# Patient Record
Sex: MALE | Race: WHITE | NOT HISPANIC OR LATINO | Employment: OTHER | URBAN - METROPOLITAN AREA
[De-identification: names, ages, dates, MRNs, and addresses within clinical notes are randomized per-mention and may not be internally consistent; named-entity substitution may affect disease eponyms.]

---

## 2021-12-12 ENCOUNTER — OFFICE VISIT (OUTPATIENT)
Dept: URGENT CARE | Facility: CLINIC | Age: 66
End: 2021-12-12
Payer: COMMERCIAL

## 2021-12-12 VITALS
WEIGHT: 174 LBS | HEIGHT: 69 IN | BODY MASS INDEX: 25.77 KG/M2 | TEMPERATURE: 98.1 F | OXYGEN SATURATION: 97 % | HEART RATE: 95 BPM | RESPIRATION RATE: 18 BRPM

## 2021-12-12 DIAGNOSIS — R68.89 FLU-LIKE SYMPTOMS: Primary | ICD-10-CM

## 2021-12-12 PROCEDURE — U0003 INFECTIOUS AGENT DETECTION BY NUCLEIC ACID (DNA OR RNA); SEVERE ACUTE RESPIRATORY SYNDROME CORONAVIRUS 2 (SARS-COV-2) (CORONAVIRUS DISEASE [COVID-19]), AMPLIFIED PROBE TECHNIQUE, MAKING USE OF HIGH THROUGHPUT TECHNOLOGIES AS DESCRIBED BY CMS-2020-01-R: HCPCS | Performed by: EMERGENCY MEDICINE

## 2021-12-12 PROCEDURE — U0005 INFEC AGEN DETEC AMPLI PROBE: HCPCS | Performed by: EMERGENCY MEDICINE

## 2021-12-12 PROCEDURE — 99203 OFFICE O/P NEW LOW 30 MIN: CPT | Performed by: EMERGENCY MEDICINE

## 2021-12-12 RX ORDER — AMLODIPINE BESYLATE 5 MG/1
5 TABLET ORAL DAILY
COMMUNITY
Start: 2021-10-28

## 2021-12-13 LAB — SARS-COV-2 RNA RESP QL NAA+PROBE: NEGATIVE

## 2025-06-15 ENCOUNTER — APPOINTMENT (EMERGENCY)
Dept: RADIOLOGY | Facility: HOSPITAL | Age: 70
End: 2025-06-15
Payer: COMMERCIAL

## 2025-06-15 ENCOUNTER — HOSPITAL ENCOUNTER (EMERGENCY)
Facility: HOSPITAL | Age: 70
Discharge: HOME | End: 2025-06-15
Attending: EMERGENCY MEDICINE
Payer: COMMERCIAL

## 2025-06-15 ENCOUNTER — APPOINTMENT (EMERGENCY)
Dept: CT IMAGING | Facility: HOSPITAL | Age: 70
End: 2025-06-15
Payer: COMMERCIAL

## 2025-06-15 VITALS
HEART RATE: 75 BPM | OXYGEN SATURATION: 98 % | WEIGHT: 172.84 LBS | BODY MASS INDEX: 25.52 KG/M2 | SYSTOLIC BLOOD PRESSURE: 159 MMHG | RESPIRATION RATE: 18 BRPM | TEMPERATURE: 97.9 F | DIASTOLIC BLOOD PRESSURE: 80 MMHG

## 2025-06-15 DIAGNOSIS — K76.9 LESION OF LIVER: ICD-10-CM

## 2025-06-15 DIAGNOSIS — R91.8 PULMONARY NODULES: ICD-10-CM

## 2025-06-15 DIAGNOSIS — K86.89 PANCREATIC MASS: Primary | ICD-10-CM

## 2025-06-15 LAB
2HR DELTA HS TROPONIN: 1 NG/L
ALBUMIN SERPL BCG-MCNC: 4.3 G/DL (ref 3.5–5)
ALP SERPL-CCNC: 160 U/L (ref 34–104)
ALT SERPL W P-5'-P-CCNC: 20 U/L (ref 7–52)
ANION GAP SERPL CALCULATED.3IONS-SCNC: 8 MMOL/L (ref 4–13)
AST SERPL W P-5'-P-CCNC: 21 U/L (ref 13–39)
BASOPHILS # BLD AUTO: 0.08 THOUSANDS/ÂΜL (ref 0–0.1)
BASOPHILS NFR BLD AUTO: 1 % (ref 0–1)
BILIRUB SERPL-MCNC: 0.48 MG/DL (ref 0.2–1)
BNP SERPL-MCNC: 18 PG/ML (ref 0–100)
BUN SERPL-MCNC: 22 MG/DL (ref 5–25)
CALCIUM SERPL-MCNC: 9.7 MG/DL (ref 8.4–10.2)
CARDIAC TROPONIN I PNL SERPL HS: 4 NG/L (ref ?–50)
CARDIAC TROPONIN I PNL SERPL HS: 5 NG/L (ref ?–50)
CHLORIDE SERPL-SCNC: 106 MMOL/L (ref 96–108)
CO2 SERPL-SCNC: 25 MMOL/L (ref 21–32)
CREAT SERPL-MCNC: 1.12 MG/DL (ref 0.6–1.3)
EOSINOPHIL # BLD AUTO: 0.26 THOUSAND/ÂΜL (ref 0–0.61)
EOSINOPHIL NFR BLD AUTO: 2 % (ref 0–6)
ERYTHROCYTE [DISTWIDTH] IN BLOOD BY AUTOMATED COUNT: 12.1 % (ref 11.6–15.1)
GFR SERPL CREATININE-BSD FRML MDRD: 66 ML/MIN/1.73SQ M
GLUCOSE SERPL-MCNC: 98 MG/DL (ref 65–140)
HCT VFR BLD AUTO: 39.4 % (ref 36.5–49.3)
HGB BLD-MCNC: 12.7 G/DL (ref 12–17)
IMM GRANULOCYTES # BLD AUTO: 0.04 THOUSAND/UL (ref 0–0.2)
IMM GRANULOCYTES NFR BLD AUTO: 0 % (ref 0–2)
LIPASE SERPL-CCNC: 32 U/L (ref 11–82)
LYMPHOCYTES # BLD AUTO: 1.52 THOUSANDS/ÂΜL (ref 0.6–4.47)
LYMPHOCYTES NFR BLD AUTO: 14 % (ref 14–44)
MCH RBC QN AUTO: 30.5 PG (ref 26.8–34.3)
MCHC RBC AUTO-ENTMCNC: 32.2 G/DL (ref 31.4–37.4)
MCV RBC AUTO: 95 FL (ref 82–98)
MONOCYTES # BLD AUTO: 1.15 THOUSAND/ÂΜL (ref 0.17–1.22)
MONOCYTES NFR BLD AUTO: 11 % (ref 4–12)
NEUTROPHILS # BLD AUTO: 7.94 THOUSANDS/ÂΜL (ref 1.85–7.62)
NEUTS SEG NFR BLD AUTO: 72 % (ref 43–75)
NRBC BLD AUTO-RTO: 0 /100 WBCS
PLATELET # BLD AUTO: 528 THOUSANDS/UL (ref 149–390)
PMV BLD AUTO: 9.8 FL (ref 8.9–12.7)
POTASSIUM SERPL-SCNC: 3.6 MMOL/L (ref 3.5–5.3)
PROT SERPL-MCNC: 8 G/DL (ref 6.4–8.4)
RBC # BLD AUTO: 4.17 MILLION/UL (ref 3.88–5.62)
SODIUM SERPL-SCNC: 139 MMOL/L (ref 135–147)
WBC # BLD AUTO: 10.99 THOUSAND/UL (ref 4.31–10.16)

## 2025-06-15 PROCEDURE — 71045 X-RAY EXAM CHEST 1 VIEW: CPT

## 2025-06-15 PROCEDURE — 85025 COMPLETE CBC W/AUTO DIFF WBC: CPT | Performed by: EMERGENCY MEDICINE

## 2025-06-15 PROCEDURE — 80053 COMPREHEN METABOLIC PANEL: CPT | Performed by: EMERGENCY MEDICINE

## 2025-06-15 PROCEDURE — 93005 ELECTROCARDIOGRAM TRACING: CPT

## 2025-06-15 PROCEDURE — 99285 EMERGENCY DEPT VISIT HI MDM: CPT

## 2025-06-15 PROCEDURE — 36415 COLL VENOUS BLD VENIPUNCTURE: CPT | Performed by: EMERGENCY MEDICINE

## 2025-06-15 PROCEDURE — 96375 TX/PRO/DX INJ NEW DRUG ADDON: CPT

## 2025-06-15 PROCEDURE — 83880 ASSAY OF NATRIURETIC PEPTIDE: CPT | Performed by: EMERGENCY MEDICINE

## 2025-06-15 PROCEDURE — 74177 CT ABD & PELVIS W/CONTRAST: CPT

## 2025-06-15 PROCEDURE — 84484 ASSAY OF TROPONIN QUANT: CPT | Performed by: EMERGENCY MEDICINE

## 2025-06-15 PROCEDURE — 83690 ASSAY OF LIPASE: CPT | Performed by: EMERGENCY MEDICINE

## 2025-06-15 PROCEDURE — 71260 CT THORAX DX C+: CPT

## 2025-06-15 PROCEDURE — 96361 HYDRATE IV INFUSION ADD-ON: CPT

## 2025-06-15 PROCEDURE — 99285 EMERGENCY DEPT VISIT HI MDM: CPT | Performed by: EMERGENCY MEDICINE

## 2025-06-15 PROCEDURE — 96374 THER/PROPH/DIAG INJ IV PUSH: CPT

## 2025-06-15 RX ORDER — PANTOPRAZOLE SODIUM 40 MG/10ML
40 INJECTION, POWDER, LYOPHILIZED, FOR SOLUTION INTRAVENOUS ONCE
Status: COMPLETED | OUTPATIENT
Start: 2025-06-15 | End: 2025-06-15

## 2025-06-15 RX ORDER — OXYCODONE AND ACETAMINOPHEN 5; 325 MG/1; MG/1
1 TABLET ORAL EVERY 8 HOURS PRN
Qty: 12 TABLET | Refills: 0 | Status: SHIPPED | OUTPATIENT
Start: 2025-06-15 | End: 2025-06-19

## 2025-06-15 RX ORDER — KETOROLAC TROMETHAMINE 30 MG/ML
15 INJECTION, SOLUTION INTRAMUSCULAR; INTRAVENOUS ONCE
Status: COMPLETED | OUTPATIENT
Start: 2025-06-15 | End: 2025-06-15

## 2025-06-15 RX ORDER — SODIUM CHLORIDE 9 MG/ML
3 INJECTION INTRAVENOUS
Status: DISCONTINUED | OUTPATIENT
Start: 2025-06-15 | End: 2025-06-15 | Stop reason: HOSPADM

## 2025-06-15 RX ORDER — ASPIRIN 81 MG/1
324 TABLET, CHEWABLE ORAL ONCE
Status: COMPLETED | OUTPATIENT
Start: 2025-06-15 | End: 2025-06-15

## 2025-06-15 RX ADMIN — KETOROLAC TROMETHAMINE 15 MG: 30 INJECTION, SOLUTION INTRAMUSCULAR at 11:38

## 2025-06-15 RX ADMIN — ASPIRIN 324 MG: 81 TABLET, CHEWABLE ORAL at 10:31

## 2025-06-15 RX ADMIN — PANTOPRAZOLE SODIUM 40 MG: 40 INJECTION, POWDER, FOR SOLUTION INTRAVENOUS at 11:39

## 2025-06-15 RX ADMIN — IOHEXOL 100 ML: 350 INJECTION, SOLUTION INTRAVENOUS at 11:25

## 2025-06-15 RX ADMIN — SODIUM CHLORIDE 1000 ML: 0.9 INJECTION, SOLUTION INTRAVENOUS at 11:38

## 2025-06-15 NOTE — ED PROVIDER NOTES
Time reflects when diagnosis was documented in both MDM as applicable and the Disposition within this note       Time User Action Codes Description Comment    6/15/2025  1:24 PM Brandee Chang Add [K86.89] Pancreatic mass     6/15/2025  1:29 PM Brandee Chang Add [R91.8] Pulmonary nodules     6/15/2025  1:30 PM Brandee Chang Add [K76.9] Lesion of liver           ED Disposition       ED Disposition   Discharge    Condition   Stable    Date/Time   Sun Nilay 15, 2025  1:24 PM    Comment   Timo Sandoval discharge to home/self care.                   Assessment & Plan       Medical Decision Making  Differential diagnosis includes but not limited to: Epigastric pain, GERD, pancreatitis,, acute cholecystitis, ACS    Amount and/or Complexity of Data Reviewed  Labs: ordered.  Radiology: ordered.    Risk  OTC drugs.  Prescription drug management.        ED Course as of 06/15/25 1549   Sun Nilay 15, 2025   1300 Patient found to have incidental finding of pancreatic tumor with concurrent bilateral pulmonary nodules, hypoattenuation of spleen as well as multiple liver lesions.  Patient was set up for outpatient follow-up with hematology oncology with an ambulatory referral.  He was discharged with strict return precautions if symptoms worsen in any way.       Medications   aspirin chewable tablet 324 mg (324 mg Oral Given 6/15/25 1031)   sodium chloride 0.9 % bolus 1,000 mL (0 mL Intravenous Stopped 6/15/25 1238)   ketorolac (TORADOL) injection 15 mg (15 mg Intravenous Given 6/15/25 1138)   pantoprazole (PROTONIX) injection 40 mg (40 mg Intravenous Given 6/15/25 1139)   iohexol (OMNIPAQUE) 350 MG/ML injection (MULTI-DOSE) 100 mL (100 mL Intravenous Given 6/15/25 1125)       ED Risk Strat Scores                    No data recorded                            History of Present Illness       Chief Complaint   Patient presents with    Chest Pain     Patient c/o midsternal chest pain with decreased appetite for several weeks.         Past Medical History[1]   Past Surgical History[2]   Family History[3]   Social History[4]   E-Cigarette/Vaping    E-Cigarette Use Never User       E-Cigarette/Vaping Substances      I have reviewed and agree with the history as documented.     This is a 70-year-old male presenting to the ED for evaluation of epigastric with decreased appetite for several weeks.  Patient denies any fever or chills, nausea vomiting or diarrhea.  He does state that he has not been having his usual appetite but denies any pronounced weight loss.  He states his discomfort is constant and nonradiating        Review of Systems   Constitutional:  Positive for appetite change. Negative for chills and fever.   HENT:  Negative for ear pain and sore throat.    Eyes:  Negative for pain and visual disturbance.   Respiratory:  Negative for cough and shortness of breath.    Cardiovascular:  Negative for chest pain and palpitations.   Gastrointestinal:  Positive for abdominal pain. Negative for vomiting.   Genitourinary:  Negative for dysuria and hematuria.   Musculoskeletal:  Negative for arthralgias and back pain.   Skin:  Negative for color change and rash.   Neurological:  Negative for seizures and syncope.   All other systems reviewed and are negative.          Objective       ED Triage Vitals   Temperature Pulse Blood Pressure Respirations SpO2 Patient Position - Orthostatic VS   06/15/25 1005 06/15/25 1005 06/15/25 1005 06/15/25 1005 06/15/25 1005 06/15/25 1005   97.9 °F (36.6 °C) 80 (!) 191/88 18 98 % Lying      Temp Source Heart Rate Source BP Location FiO2 (%) Pain Score    06/15/25 1005 06/15/25 1005 06/15/25 1005 -- 06/15/25 1138    Temporal Monitor Left arm  3      Vitals      Date and Time Temp Pulse SpO2 Resp BP Pain Score FACES Pain Rating User   06/15/25 1200 -- 75 98 % 18 159/80 -- -- AS   06/15/25 1138 -- -- -- -- -- 3 -- AS   06/15/25 1100 -- 79 98 % 19 162/79 -- -- MY   06/15/25 1045 -- 82 98 % 20 171/88 -- -- MY    06/15/25 1030 -- 77 98 % 17 162/83 -- -- MY   06/15/25 1015 -- 79 97 % 18 170/66 -- -- MY   06/15/25 1005 97.9 °F (36.6 °C) 80 98 % 18 191/88 -- -- AFG            Physical Exam  Vitals and nursing note reviewed.   Constitutional:       General: He is not in acute distress.     Appearance: He is well-developed and normal weight. He is not ill-appearing.   HENT:      Head: Normocephalic and atraumatic.     Eyes:      Extraocular Movements: Extraocular movements intact.      Conjunctiva/sclera: Conjunctivae normal.       Cardiovascular:      Rate and Rhythm: Normal rate and regular rhythm.      Heart sounds: Normal heart sounds. No murmur heard.  Pulmonary:      Effort: Pulmonary effort is normal. No respiratory distress.      Breath sounds: Normal breath sounds. No rhonchi or rales.   Chest:      Chest wall: No mass, deformity, tenderness or crepitus. There is no dullness to percussion.   Abdominal:      Palpations: Abdomen is soft. There is no mass.      Tenderness: There is no abdominal tenderness. There is no guarding or rebound.      Comments: Mild epigastric tenderness without guarding     Musculoskeletal:         General: No swelling. Normal range of motion.      Cervical back: Normal range of motion and neck supple.      Right lower leg: No edema.      Left lower leg: No edema.     Skin:     General: Skin is warm and dry.      Capillary Refill: Capillary refill takes less than 2 seconds.     Neurological:      General: No focal deficit present.      Mental Status: He is alert and oriented to person, place, and time.     Psychiatric:         Mood and Affect: Mood normal.         Results Reviewed       Procedure Component Value Units Date/Time    HS Troponin I 2hr [[id number]]  (Normal) Collected: 06/15/25 1226    Lab Status: Final result Specimen: Blood from Arm, Left Updated: 06/15/25 1253     hs TnI 2hr 5 ng/L      Delta 2hr hsTnI 1 ng/L     HS Troponin 0hr (reflex protocol) [[id number]]  (Normal) Collected:  06/15/25 1031    Lab Status: Final result Specimen: Blood from Arm, Left Updated: 06/15/25 1125     hs TnI 0hr 4 ng/L     B-Type Natriuretic Peptide(BNP) [[id number]]  (Normal) Collected: 06/15/25 1031    Lab Status: Final result Specimen: Blood from Arm, Left Updated: 06/15/25 1108     BNP 18 pg/mL     Comprehensive metabolic panel [[id number]]  (Abnormal) Collected: 06/15/25 1031    Lab Status: Final result Specimen: Blood from Arm, Left Updated: 06/15/25 1057     Sodium 139 mmol/L      Potassium 3.6 mmol/L      Chloride 106 mmol/L      CO2 25 mmol/L      ANION GAP 8 mmol/L      BUN 22 mg/dL      Creatinine 1.12 mg/dL      Glucose 98 mg/dL      Calcium 9.7 mg/dL      AST 21 U/L      ALT 20 U/L      Alkaline Phosphatase 160 U/L      Total Protein 8.0 g/dL      Albumin 4.3 g/dL      Total Bilirubin 0.48 mg/dL      eGFR 66 ml/min/1.73sq m     Narrative:      National Kidney Disease Foundation guidelines for Chronic Kidney Disease (CKD):     Stage 1 with normal or high GFR (GFR > 90 mL/min/1.73 square meters)    Stage 2 Mild CKD (GFR = 60-89 mL/min/1.73 square meters)    Stage 3A Moderate CKD (GFR = 45-59 mL/min/1.73 square meters)    Stage 3B Moderate CKD (GFR = 30-44 mL/min/1.73 square meters)    Stage 4 Severe CKD (GFR = 15-29 mL/min/1.73 square meters)    Stage 5 End Stage CKD (GFR <15 mL/min/1.73 square meters)  Note: GFR calculation is accurate only with a steady state creatinine    Lipase [[id number]]  (Normal) Collected: 06/15/25 1031    Lab Status: Final result Specimen: Blood from Arm, Left Updated: 06/15/25 1057     Lipase 32 u/L     CBC and differential [[id number]]  (Abnormal) Collected: 06/15/25 1031    Lab Status: Final result Specimen: Blood from Arm, Left Updated: 06/15/25 1038     WBC 10.99 Thousand/uL      RBC 4.17 Million/uL      Hemoglobin 12.7 g/dL      Hematocrit 39.4 %      MCV 95 fL      MCH 30.5 pg      MCHC 32.2 g/dL      RDW 12.1 %      MPV 9.8 fL      Platelets 528 Thousands/uL      nRBC 0  /100 WBCs      Segmented % 72 %      Immature Grans % 0 %      Lymphocytes % 14 %      Monocytes % 11 %      Eosinophils Relative 2 %      Basophils Relative 1 %      Absolute Neutrophils 7.94 Thousands/µL      Absolute Immature Grans 0.04 Thousand/uL      Absolute Lymphocytes 1.52 Thousands/µL      Absolute Monocytes 1.15 Thousand/µL      Eosinophils Absolute 0.26 Thousand/µL      Basophils Absolute 0.08 Thousands/µL             CT chest abdomen pelvis w contrast   Final Interpretation by Roxanna Madrigal MD (06/15 1240)   There is a pancreatic lesion suspicious for pancreatic cancer summarized below. Please see body of report for full description:      Tumor:   Size: 4.1 cm   Location: Pancreatic tail.   Vascular contact: Encasing and narrowing the splenic artery and vein in the splenic.   Metastasis: Hepatic and pulmonary .      Hypoattenuation in the anterior spleen likely represents splenic infarct versus metastasis.      Asymmetric enlargement of the prostate.      Computerized Assisted Algorithm (CAA) may have aided analysis of applicable images.      Resident: Bhavana Parekh I, the attending radiologist, have reviewed the images and agree with the final report above.      Workstation performed: [id number]         X-ray chest 1 view portable    (Results Pending)       Procedures    ED Medication and Procedure Management   Prior to Admission Medications   Prescriptions Last Dose Informant Patient Reported? Taking?   amLODIPine (NORVASC) 5 mg tablet   Yes No   Sig: Take 5 mg by mouth daily   atorvastatin (LIPITOR) 10 mg tablet   Yes No   Sig: Take 10 mg by mouth daily      Facility-Administered Medications: None     Discharge Medication List as of 6/15/2025  1:31 PM        START taking these medications    Details   oxyCODONE-acetaminophen (Percocet) 5-325 mg per tablet Take 1 tablet by mouth every 8 (eight) hours as needed for severe pain for up to 4 days Max Daily Amount: 3 tablets, Starting Sun  6/15/2025, Until Thu 6/19/2025 at 2359, Normal           CONTINUE these medications which have NOT CHANGED    Details   amLODIPine (NORVASC) 5 mg tablet Take 5 mg by mouth daily, Starting Thu 10/28/2021, Historical Med      atorvastatin (LIPITOR) 10 mg tablet Take 10 mg by mouth daily, Starting Thu 2/29/2024, Historical Med             ED SEPSIS DOCUMENTATION   Time reflects when diagnosis was documented in both MDM as applicable and the Disposition within this note       Time User Action Codes Description Comment    6/15/2025  1:24 PM Brandee Chang [K86.89] Pancreatic mass     6/15/2025  1:29 PM Brandee Chang [R91.8] Pulmonary nodules     6/15/2025  1:30 PM Brandee Chang [K76.9] Lesion of liver                      [1]   Past Medical History:  Diagnosis Date    Hypertension    [2] No past surgical history on file.  [3] No family history on file.  [4]   Social History  Tobacco Use    Smoking status: Never    Smokeless tobacco: Never   Vaping Use    Vaping status: Never Used   Substance Use Topics    Alcohol use: Yes     Comment: Beer daily    Drug use: Yes     Types: Marijuana        Brandee Chang DO  06/15/25 1549

## 2025-06-15 NOTE — DISCHARGE INSTRUCTIONS
Return to the ER immediately for any worsening symptoms.  You were diagnosed today with a pancreatic mass, pulmonary nodules and multiple liver lesions.  I have placed an ambulatory referral for you with the oncologist.

## 2025-06-16 LAB
ATRIAL RATE: 82 BPM
P AXIS: 63 DEGREES
PR INTERVAL: 156 MS
QRS AXIS: 18 DEGREES
QRSD INTERVAL: 96 MS
QT INTERVAL: 384 MS
QTC INTERVAL: 448 MS
T WAVE AXIS: 22 DEGREES
VENTRICULAR RATE: 82 BPM

## 2025-06-16 PROCEDURE — 93010 ELECTROCARDIOGRAM REPORT: CPT | Performed by: INTERNAL MEDICINE

## 2025-06-17 ENCOUNTER — DOCUMENTATION (OUTPATIENT)
Dept: HEMATOLOGY ONCOLOGY | Facility: CLINIC | Age: 70
End: 2025-06-17

## 2025-06-17 ENCOUNTER — TELEPHONE (OUTPATIENT)
Age: 70
End: 2025-06-17

## 2025-06-17 NOTE — PROGRESS NOTES
Chart rvw'd on 2025  Referral received to [facility], sent to CATARINA for further work up.    Referring Provider: Dr Chang    Diagnosis: pancreatic mass    EGD/EUS: N/A    Pathology: N/A    Imagin/15/25 CT c/a/p w contrast  There is a pancreatic lesion suspicious for pancreatic cancer summarized below. Please see body of report for full description:     Tumor:  Size: 4.1 cm  Location: Pancreatic tail.  Vascular contact: Encasing and narrowing the splenic artery and vein in the splenic.  Metastasis: Hepatic and pulmonary .     Hypoattenuation in the anterior spleen likely represents splenic infarct versus metastasis.     Asymmetric enlargement of the prostate.    Labs:  CBC & CMP UTD    Scheduled appointments:  No future appointments.

## 2025-06-17 NOTE — TELEPHONE ENCOUNTER
I called Timo in response to a referral that was received for patient to establish care with Medical Oncology    Outreach was made to schedule a consultation.    A consultation was scheduled for patient during this call. Patient is scheduled on 6/24/25 at 3:30 PM with Dr Prieto at the [facility]  Has the patient been seen inpatient or outpatient ? (Within in the last 3 years) No If so when, N/A    Schedule within: 3 days    Pt scheduled for first available appt at requested location.   Please advise if this appt can be moved to meet scheduling guidelines per NN review.

## 2025-06-19 ENCOUNTER — PREP FOR PROCEDURE (OUTPATIENT)
Dept: INTERVENTIONAL RADIOLOGY/VASCULAR | Facility: CLINIC | Age: 70
End: 2025-06-19

## 2025-06-19 ENCOUNTER — DOCUMENTATION (OUTPATIENT)
Dept: HEMATOLOGY ONCOLOGY | Facility: CLINIC | Age: 70
End: 2025-06-19

## 2025-06-19 DIAGNOSIS — R16.0 LIVER MASS: Primary | ICD-10-CM

## 2025-06-19 DIAGNOSIS — K86.89 PANCREATIC MASS: Primary | ICD-10-CM

## 2025-06-19 NOTE — PROGRESS NOTES
[facility] Epic msg regarding this pt from MD. 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 Msg sent by ONN to Kirsty Billings to get pt set up for liver bx after review of CT scan results.

## 2025-06-23 ENCOUNTER — PATIENT OUTREACH (OUTPATIENT)
Dept: HEMATOLOGY ONCOLOGY | Facility: CLINIC | Age: 70
End: 2025-06-23

## 2025-06-23 ENCOUNTER — DOCUMENTATION (OUTPATIENT)
Dept: HEMATOLOGY ONCOLOGY | Facility: CLINIC | Age: 70
End: 2025-06-23

## 2025-06-23 DIAGNOSIS — K86.89 MASS OF PANCREAS: Primary | ICD-10-CM

## 2025-06-23 NOTE — PROGRESS NOTES
Call to the IR scheduling team to get pt scheduled for liver biopsy,  is going to get pt in soon and message me back with a date.

## 2025-06-23 NOTE — PROGRESS NOTES
Initial outreach. Spoke to Timo Sandoval. Introduced myself and explained the role of an Oncology Nurse Navigator to assist with coordination of cancer care, preparation for upcoming appointment, be a point of contact prior to oncology consult, provide support and connect him with available resources. Discussed Medical oncology referral placed by Dr Chang. Explained I will be their main contact until they are established with their team and a treatment plan is established.     Assessed for barriers of care.   General assessment completed Yes    Do you have a history of cancer? no  Have you ever received Radiation Therapy? No  Where  did you receive RT? N/A   When? N/A    Do you have any implantable devices?   [] Pacemaker  [] Pain stimulator  [] Defibrillator  [] Implanted sleep apnea device  [x] NONE      NVD/constipation: Yes Diarrhea mostly   Weight Loss: Yes About 9 lbs  Appetite change: Yes Decreased appetite the past month (thought he was in shape more)  Fatigue: Yes- As a result of fatigue  Pain: Yes  Other: Pt states that he is down, he tries to walk daily but he has had a hard time after the CT scan.   Pt is a , he is still doing it.     Offered appt for the dietician but he declined since he understands what food choices to make.     PCP: Dr. Rodriges ([facility])  Insurance: [facility]      Reviewed upcoming appointments including date, time and location.  Future Appointments   Date Time Provider Department Center   6/24/2025  3:30 PM Nahid Prieto MD [facility]       Introduced Sabina Pickett, and explained she will be his patient navigator, who will reach out after the first consult to introduce themselves. The PN will provide support, make sure he has a good understanding of the plan and schedule and to connect with additional resources if/when needed.     My direct contact information provided. Patient is aware that he can call me should he need  any further assistance. Patient was appreciative of assistance.

## 2025-06-24 ENCOUNTER — DOCUMENTATION (OUTPATIENT)
Dept: HEMATOLOGY ONCOLOGY | Facility: CLINIC | Age: 70
End: 2025-06-24

## 2025-06-24 ENCOUNTER — PATIENT OUTREACH (OUTPATIENT)
Dept: HEMATOLOGY ONCOLOGY | Facility: CLINIC | Age: 70
End: 2025-06-24

## 2025-06-24 RX ORDER — AMLODIPINE BESYLATE 10 MG/1
10 TABLET ORAL
COMMUNITY
Start: 2025-06-11

## 2025-06-24 RX ORDER — ALPRAZOLAM 0.25 MG
0.25 TABLET ORAL DAILY PRN
COMMUNITY
Start: 2025-06-20

## 2025-06-24 NOTE — PROGRESS NOTES
NN phone outreach to the pt, we got him sched tomorrow at the [place] location with Dr. Harper for his pall care appt sched at 1230 PM. He was so thankful and appreciative of this appt spot since he mentions his pain being worse today than from when we last spoke.

## 2025-06-24 NOTE — NURSING NOTE
[facility] Interventional Radiology        [address] [place] [address]             Phone:  [phone]                  IR Scheduling: [phone]         Patient called and made aware to arrive by 0930 on 6/30/25 for liver mass biopsy. Patient told to have nothing to eat or drink starting at midnight the night prior as well as have a ride to and from the hospital. Patient verbalized understanding and given [facility] IR phone number, [phone], if they were to have any further questions.   
175.9

## 2025-06-24 NOTE — PROGRESS NOTES
Josue msg from pt's daughter regarding his severe pain, looking to get pt scheduled asap for pall care appt. Msg sent via epic secure chat to Pratima Soto to assist with appt scheduling. Pending response.

## 2025-06-25 ENCOUNTER — SOCIAL WORK (OUTPATIENT)
Dept: PALLIATIVE MEDICINE | Facility: CLINIC | Age: 70
End: 2025-06-25
Payer: COMMERCIAL

## 2025-06-25 ENCOUNTER — CONSULT (OUTPATIENT)
Dept: PALLIATIVE MEDICINE | Facility: CLINIC | Age: 70
End: 2025-06-25
Payer: COMMERCIAL

## 2025-06-25 VITALS
TEMPERATURE: 98.7 F | BODY MASS INDEX: 24.35 KG/M2 | HEIGHT: 69 IN | SYSTOLIC BLOOD PRESSURE: 155 MMHG | OXYGEN SATURATION: 99 % | RESPIRATION RATE: 16 BRPM | WEIGHT: 164.4 LBS | DIASTOLIC BLOOD PRESSURE: 75 MMHG | HEART RATE: 83 BPM

## 2025-06-25 VITALS
HEIGHT: 69 IN | WEIGHT: 164 LBS | SYSTOLIC BLOOD PRESSURE: 155 MMHG | HEART RATE: 83 BPM | OXYGEN SATURATION: 99 % | DIASTOLIC BLOOD PRESSURE: 75 MMHG | TEMPERATURE: 98.7 F | BODY MASS INDEX: 24.29 KG/M2 | RESPIRATION RATE: 16 BRPM

## 2025-06-25 DIAGNOSIS — K86.89 MASS OF PANCREAS: Primary | ICD-10-CM

## 2025-06-25 DIAGNOSIS — Z51.5 PALLIATIVE CARE BY SPECIALIST: ICD-10-CM

## 2025-06-25 DIAGNOSIS — R10.11 RIGHT UPPER QUADRANT ABDOMINAL PAIN: ICD-10-CM

## 2025-06-25 PROCEDURE — NC001 PR NO CHARGE

## 2025-06-25 PROCEDURE — 99203 OFFICE O/P NEW LOW 30 MIN: CPT | Performed by: STUDENT IN AN ORGANIZED HEALTH CARE EDUCATION/TRAINING PROGRAM

## 2025-06-25 NOTE — PROGRESS NOTES
Pt scheduled an appt with Palliative but was unsure what we do. Pt reports he was looking for a one time script for pain. MD explained to pt that we have an ongoing relationship with our patients. Pt reported that he was not interested in our services at this time. Pt  pleasantly declined to proceed with visit. MD and MELIZA understanding and let Pt know if he ever changes his mind we are here to support him in the future.

## 2025-06-25 NOTE — ASSESSMENT & PLAN NOTE
Upcoming liver biopsy and oncology appointment  Orders:    Ambulatory referral to Palliative Care

## 2025-06-25 NOTE — PROGRESS NOTES
Name: Timo Sandoval      : 1955      MRN: [id number]  Encounter Provider: Juliana Harper MD  Encounter Date: 2025   Encounter department: [facility]  :  Assessment & Plan  Mass of pancreas  Upcoming liver biopsy and oncology appointment  Orders:    Ambulatory referral to Palliative Care    Right upper quadrant abdominal pain  Advised he contact his PCP for pain medications until he sees oncology       Palliative care by specialist  Introduced palliative care and the services we provide  Time spent developing patient-provider relationship, providing psychosocial support, and validating patient experience  Encouraged to call the office with any questions/concerns  Follow up as needed           PDMP Review: I have reviewed the patient's controlled substance dispensing history in the Prescription Drug Monitoring Program in compliance with the [facility] regulations before prescribing any controlled substances.    History of Present Illness     Timo Sandoval is a 71 yo M with recently discovered pancreatic tail mass with liver and lung lesions (likely mets) who presents for palliative care consultation. He is scheduled for IR liver biopsy to better characterize these masses next week and then will see Dr. Prieto the week after. In the meantime, he is hoping for assistance with pain management. He was prescribed a short course of low dose opioids earlier this month.    He explains that he thought this appointment would be a one-time visit to get a different pain medication for the pain in his abdomen. I explained that in order to provide controlled substances we have our patients sign an agreement and engage in regular follow ups for monitoring of symptoms and side effects.    He does not wish to establish with palliative care at this time. I explained the services we provide and advised he reach out should he have interest in following with us in the future. He expressed  "understanding.     Objective   /75 (BP Location: Left arm, Patient Position: Sitting, Cuff Size: Large)   Pulse 83   Temp 98.7 °F (37.1 °C) (Temporal)   Resp 16   Ht 5' 9\" (1.753 m)   Wt 74.6 kg (164 lb 6.4 oz)   SpO2 99%   BMI 24.28 kg/m²     Physical Exam  Constitutional:       Appearance: He is not ill-appearing.   Pulmonary:      Effort: Pulmonary effort is normal. No respiratory distress.     Skin:     General: Skin is warm and dry.      Coloration: Skin is not pale.     Neurological:      General: No focal deficit present.      Mental Status: He is alert.     Psychiatric:      Comments: Guarded, cooperative         Recent labs:  Lab Results   Component Value Date/Time    SODIUM 139 06/15/2025 10:31 AM    SODIUM 142 01/21/2025 08:59 AM    K 3.6 06/15/2025 10:31 AM    K 5 01/21/2025 08:59 AM    BUN 22 06/15/2025 10:31 AM    BUN 29 (H) 01/21/2025 08:59 AM    CREATININE 1.12 06/15/2025 10:31 AM    CREATININE 1.45 (H) 02/03/2025 08:37 AM    GLUC 98 06/15/2025 10:31 AM    GLUC 111 (H) 01/21/2025 08:59 AM    CALCIUM 9.7 06/15/2025 10:31 AM    CALCIUM 9.5 01/21/2025 08:59 AM    AST 21 06/15/2025 10:31 AM    AST 16 01/21/2025 08:59 AM    ALT 20 06/15/2025 10:31 AM    ALT 14 01/21/2025 08:59 AM    ALB 4.3 06/15/2025 10:31 AM    ALB 4.5 01/21/2025 08:59 AM    TP 8.0 06/15/2025 10:31 AM    TP 7.1 01/21/2025 08:59 AM    EGFR 66 06/15/2025 10:31 AM    EGFR 52 (L) 02/03/2025 08:37 AM    EGFR 67 04/15/2020 10:24 AM     Lab Results   Component Value Date/Time    HGB 12.7 06/15/2025 10:31 AM    WBC 10.99 (H) 06/15/2025 10:31 AM     (H) 06/15/2025 10:31 AM     No results found for: \"[id number]\"    Recent Imaging:  Procedure: X-ray chest 1 view portable  Result Date: 6/16/2025  Impression: No acute cardiopulmonary disease. See subsequent chest CT. Workstation performed: [id number]     Procedure: CT chest abdomen pelvis w contrast  Addendum Date: 6/15/2025  Addendum: ADDENDUM: There is loss of fat plane " between the pancreatic tail mass and the spleen at the splenic hilum. Direct splenic invasion cannot be excluded (image 91 series 604).    Result Date: 6/15/2025  Impression: There is a pancreatic lesion suspicious for pancreatic cancer summarized below. Please see body of report for full description: Tumor: Size: 4.1 cm Location: Pancreatic tail. Vascular contact: Encasing and narrowing the splenic artery and vein in the splenic. Metastasis: Hepatic and pulmonary . Hypoattenuation in the anterior spleen likely represents splenic infarct versus metastasis. Asymmetric enlargement of the prostate. Computerized Assisted Algorithm (CAA) may have aided analysis of applicable images. Resident: Bhavana Parekh I, the attending radiologist, have reviewed the images and agree with the final report above. Workstation performed: [id number]       Administrative Statements   I have spent a total time of 35 minutes in caring for this patient on the day of the visit/encounter including Diagnostic results, Prognosis, Risks and benefits of tx options, Patient and family education, Impressions, Counseling / Coordination of care, Documenting in the medical record, Reviewing/placing orders in the medical record (including tests, medications, and/or procedures), and Obtaining or reviewing history  .   Topics discussed with the patient / family include symptom assessment and management, medication review, psychosocial support, goals of care, opioid titration, supportive listening, and anticipatory guidance.

## 2025-06-30 ENCOUNTER — HOSPITAL ENCOUNTER (OUTPATIENT)
Dept: INTERVENTIONAL RADIOLOGY/VASCULAR | Facility: HOSPITAL | Age: 70
Discharge: HOME | End: 2025-06-30
Attending: RADIOLOGY
Payer: COMMERCIAL

## 2025-06-30 VITALS
SYSTOLIC BLOOD PRESSURE: 156 MMHG | HEIGHT: 69 IN | TEMPERATURE: 98 F | WEIGHT: 165 LBS | OXYGEN SATURATION: 98 % | DIASTOLIC BLOOD PRESSURE: 81 MMHG | HEART RATE: 77 BPM | BODY MASS INDEX: 24.44 KG/M2 | RESPIRATION RATE: 20 BRPM

## 2025-06-30 DIAGNOSIS — K86.89 MASS OF PANCREAS: Primary | ICD-10-CM

## 2025-06-30 DIAGNOSIS — R16.0 LIVER MASS: ICD-10-CM

## 2025-06-30 LAB
ERYTHROCYTE [DISTWIDTH] IN BLOOD BY AUTOMATED COUNT: 11.9 % (ref 11.6–15.1)
HCT VFR BLD AUTO: 36.5 % (ref 36.5–49.3)
HGB BLD-MCNC: 11.7 G/DL (ref 12–17)
INR PPP: 1.07 (ref 0.85–1.19)
MCH RBC QN AUTO: 30.5 PG (ref 26.8–34.3)
MCHC RBC AUTO-ENTMCNC: 32.1 G/DL (ref 31.4–37.4)
MCV RBC AUTO: 95 FL (ref 82–98)
PLATELET # BLD AUTO: 460 THOUSANDS/UL (ref 149–390)
PMV BLD AUTO: 10 FL (ref 8.9–12.7)
PROTHROMBIN TIME: 14.3 SECONDS (ref 12.3–15)
RBC # BLD AUTO: 3.83 MILLION/UL (ref 3.88–5.62)
WBC # BLD AUTO: 11.27 THOUSAND/UL (ref 4.31–10.16)

## 2025-06-30 PROCEDURE — 99152 MOD SED SAME PHYS/QHP 5/>YRS: CPT

## 2025-06-30 PROCEDURE — 88341 IMHCHEM/IMCYTCHM EA ADD ANTB: CPT | Performed by: PATHOLOGY

## 2025-06-30 PROCEDURE — 88342 IMHCHEM/IMCYTCHM 1ST ANTB: CPT | Performed by: PATHOLOGY

## 2025-06-30 PROCEDURE — 85610 PROTHROMBIN TIME: CPT | Performed by: RADIOLOGY

## 2025-06-30 PROCEDURE — 88307 TISSUE EXAM BY PATHOLOGIST: CPT | Performed by: PATHOLOGY

## 2025-06-30 PROCEDURE — 85027 COMPLETE CBC AUTOMATED: CPT | Performed by: RADIOLOGY

## 2025-06-30 PROCEDURE — 47000 NEEDLE BIOPSY OF LIVER PERQ: CPT

## 2025-06-30 PROCEDURE — 76942 ECHO GUIDE FOR BIOPSY: CPT

## 2025-06-30 RX ORDER — LIDOCAINE WITH 8.4% SOD BICARB 0.9%(10ML)
SYRINGE (ML) INJECTION AS NEEDED
Status: COMPLETED | OUTPATIENT
Start: 2025-06-30 | End: 2025-06-30

## 2025-06-30 RX ORDER — FENTANYL CITRATE 50 UG/ML
INJECTION, SOLUTION INTRAMUSCULAR; INTRAVENOUS AS NEEDED
Status: COMPLETED | OUTPATIENT
Start: 2025-06-30 | End: 2025-06-30

## 2025-06-30 RX ORDER — OXYCODONE AND ACETAMINOPHEN 5; 325 MG/1; MG/1
1 TABLET ORAL EVERY 8 HOURS PRN
Qty: 30 TABLET | Refills: 0 | Status: SHIPPED | OUTPATIENT
Start: 2025-06-30 | End: 2025-07-10

## 2025-06-30 RX ORDER — MIDAZOLAM HYDROCHLORIDE 2 MG/2ML
INJECTION, SOLUTION INTRAMUSCULAR; INTRAVENOUS AS NEEDED
Status: COMPLETED | OUTPATIENT
Start: 2025-06-30 | End: 2025-06-30

## 2025-06-30 RX ADMIN — Medication 5 ML: at 11:19

## 2025-06-30 RX ADMIN — MIDAZOLAM HYDROCHLORIDE 1 MG: 1 INJECTION, SOLUTION INTRAMUSCULAR; INTRAVENOUS at 11:21

## 2025-06-30 RX ADMIN — MIDAZOLAM HYDROCHLORIDE 1 MG: 1 INJECTION, SOLUTION INTRAMUSCULAR; INTRAVENOUS at 11:14

## 2025-06-30 RX ADMIN — FENTANYL CITRATE 50 MCG: 50 INJECTION, SOLUTION INTRAMUSCULAR; INTRAVENOUS at 11:15

## 2025-06-30 RX ADMIN — FENTANYL CITRATE 50 MCG: 50 INJECTION, SOLUTION INTRAMUSCULAR; INTRAVENOUS at 11:22

## 2025-06-30 NOTE — H&P
"Interventional Radiology  History and Physical 6/30/2025     Timo Sandoval   1955   [id number]    Assessment/Plan:  70 yr old male with recently diagnosed pancreatic mass with multiple liver lesions. Here as outpatient for liver lesion biopsy. After explaining benefits and risks informed consent obtained.    Problem List Items Addressed This Visit    None  Visit Diagnoses         Liver mass        Relevant Orders    IR biopsy liver mass               Subjective:     Patient ID: Timo Sandoval is a 70 y.o. male.    History of Present Illness  70 yr old male here as outpatient for liver lesion biopsy. US guidance will be used for biopsy.    Review of Systems      Past Medical History[1]     Past Surgical History[2]     Tobacco Use History[3]     Social History     Substance and Sexual Activity   Alcohol Use Yes    Alcohol/week: 24.0 standard drinks of alcohol    Types: 24 Cans of beer per week    Comment: Beer daily        Social History     Substance and Sexual Activity   Drug Use Yes    Types: Marijuana        Allergies[4]    Current Medications[5]       Objective:    Vitals:    06/30/25 1002 06/30/25 1110   BP: 154/76 162/76   Pulse: 79 79   Resp: 20 20   Temp: 98.3 °F (36.8 °C)    SpO2: 97% 98%   Weight: 74.8 kg (165 lb)    Height: 5' 9\" (1.753 m)         Physical Exam      Lab Results   Component Value Date    BNP 18 06/15/2025      Lab Results   Component Value Date    WBC 11.27 (H) 06/30/2025    HGB 11.7 (L) 06/30/2025    HCT 36.5 06/30/2025    MCV 95 06/30/2025     (H) 06/30/2025     Lab Results   Component Value Date    INR 1.07 06/30/2025    PROTIME 14.3 06/30/2025     No results found for: \"PTT\"      I have personally reviewed pertinent imaging and laboratory results.     Code Status: No Order  Advance Directive and Living Will:      Power of :    POLST:      This text is generated with voice recognition software. There may be translation, syntax,  or grammatical errors. If you have " any questions, please contact the dictating provider.        [1]   Past Medical History:  Diagnosis Date    Hypertension    [2] No past surgical history on file.  [3]   Social History  Tobacco Use   Smoking Status Never   Smokeless Tobacco Never   [4] No Known Allergies  [5]   Current Outpatient Medications   Medication Sig Dispense Refill    ALPRAZolam (XANAX) 0.25 mg tablet Take 0.25 mg by mouth daily as needed      amLODIPine (NORVASC) 10 mg tablet Take 10 mg by mouth      atorvastatin (LIPITOR) 10 mg tablet Take 10 mg by mouth in the morning.       No current facility-administered medications for this encounter.

## 2025-06-30 NOTE — BRIEF OP NOTE (RAD/CATH)
INTERVENTIONAL RADIOLOGY PROCEDURE NOTE    Date: 6/30/2025    Procedure: Right lobe lesion core biopsy  Procedure Summary       Date: 06/30/25 Room / Location: [facility] Interventional Radiology    Anesthesia Start:  Anesthesia Stop:     Procedure: IR BIOPSY LIVER MASS Diagnosis:       Liver mass      (multiple liver lesions concerning for mets)    Scheduled Providers:  Responsible Provider:     Anesthesia Type: Not recorded ASA Status: Not recorded            Preoperative diagnosis:   1. Liver mass         Postoperative diagnosis: Same.    Surgeon: Tito Chung MD     Assistant: None. No qualified resident was available.    Blood loss: None    Specimens: Right lobe liver lesion     Findings: US showed the right hepatic lesion accessible for biopsy and 18 G x 4 core biopsies obtained and placed in Formalin. D Stat injected via coaxial needle.    Complications: None immediate.    Anesthesia: conscious sedation

## 2025-06-30 NOTE — DISCHARGE INSTRUCTIONS
[facility] Interventional Radiology        [address] [place] [address]             Phone:  [phone]                  IR Scheduling: [phone]                   POST BIOPSY    Care after your procedure:    1. Limit your activities for 24 hours after your biopsy.    2. No driving day of biopsy.    3. Return to your normal diet.Small sips of flat soda will help with mild nausea.    4. Remove band-aid or dressing 24 hours after procedure.     Contact Interventional Radiology at [phone] (ASHOK PATIENTS: Contact Interventional Radiology at [phone]) (VALENTIN PATIENTS: Contact Interventional Radiology at [phone]) if:    1. Difficulty breathing, nausea or vomiting.    2. Chills or fever above 101 degrees F.     3. Pain at biopsy site not relieved by medication.     4. Develop any redness, swelling, heat, unusual drainage, heavy bruising or bleeding from biopsy site.

## 2025-07-03 DIAGNOSIS — C25.2 MALIGNANT NEOPLASM OF TAIL OF PANCREAS (HCC): Primary | ICD-10-CM

## 2025-07-07 ENCOUNTER — OFFICE VISIT (OUTPATIENT)
Dept: HEMATOLOGY ONCOLOGY | Facility: CLINIC | Age: 70
End: 2025-07-07
Attending: EMERGENCY MEDICINE
Payer: COMMERCIAL

## 2025-07-07 ENCOUNTER — TELEPHONE (OUTPATIENT)
Age: 70
End: 2025-07-07

## 2025-07-07 ENCOUNTER — APPOINTMENT (OUTPATIENT)
Dept: LAB | Facility: MEDICAL CENTER | Age: 70
End: 2025-07-07
Payer: COMMERCIAL

## 2025-07-07 VITALS
WEIGHT: 163 LBS | BODY MASS INDEX: 24.14 KG/M2 | HEART RATE: 71 BPM | OXYGEN SATURATION: 99 % | RESPIRATION RATE: 16 BRPM | DIASTOLIC BLOOD PRESSURE: 66 MMHG | HEIGHT: 69 IN | TEMPERATURE: 97.2 F | SYSTOLIC BLOOD PRESSURE: 142 MMHG

## 2025-07-07 DIAGNOSIS — C25.9 CARCINOMA OF PANCREAS METASTATIC TO LIVER (HCC): ICD-10-CM

## 2025-07-07 DIAGNOSIS — K86.89 PANCREATIC MASS: ICD-10-CM

## 2025-07-07 DIAGNOSIS — C78.7 CARCINOMA OF PANCREAS METASTATIC TO LIVER (HCC): ICD-10-CM

## 2025-07-07 DIAGNOSIS — C25.2 MALIGNANT NEOPLASM OF TAIL OF PANCREAS (HCC): ICD-10-CM

## 2025-07-07 DIAGNOSIS — K86.89 MASS OF PANCREAS: ICD-10-CM

## 2025-07-07 DIAGNOSIS — K86.89 MASS OF PANCREAS: Primary | ICD-10-CM

## 2025-07-07 PROCEDURE — 36415 COLL VENOUS BLD VENIPUNCTURE: CPT

## 2025-07-07 PROCEDURE — 99204 OFFICE O/P NEW MOD 45 MIN: CPT | Performed by: INTERNAL MEDICINE

## 2025-07-07 PROCEDURE — G2211 COMPLEX E/M VISIT ADD ON: HCPCS | Performed by: INTERNAL MEDICINE

## 2025-07-07 NOTE — TELEPHONE ENCOUNTER
Patient called to schedule an appointment with . Patient was accidentally scheduled as a new patient and was scheduled for 8/8 at 8:30AM. Appointment was canceled and rescheduled, [employer] message sent. Patient is requesting to be seen sooner than 9/22. Patient aware he is placed on wait-list.     Please call patient for sooner appointment if able.      Thanks.

## 2025-07-07 NOTE — ASSESSMENT & PLAN NOTE
This is a 70 y.o. 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

## 2025-07-07 NOTE — PROGRESS NOTES
Oncology Teach:   Review of Plan:  Diagnosis Reviewed    Notes:  RN reviewed office handouts.  Chemotherapy and you booklet given.   Reviewed purpose of guardant testing. Form reviewed and signed by Dr. Prieto.  Kit given to patient.    Patient returning to office in 2 days to review treatment plan. [facility] referral placed, phone numbers given to patient/family to utilize if needed.    Review of Pre-Treatment Needs:  Transportation    If PICC needed, was consent obtained?:  No    Expectations at First Appointment Reviewed:  Yes    Patient Medication Education Reviewed:  Yes    Review when to call office vs. present to ED:  Yes    Provided Oncology Access Center Number:  Yes    Assessment of patient understanding:  Pt demonstrates understanding    Chemo consent obtained?:  No

## 2025-07-07 NOTE — PROGRESS NOTES
Name: Timo Sandoval      : 1955      MRN: [id number]  Encounter Provider: Nahid Prieto MD  Encounter Date: 2025   Encounter department[facility]  :  Assessment & Plan  Mass of pancreas  This is a 70 y.o. 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 Pancreatic cancer.  The patient's symptoms and imaging findings are consistent with advanced pancreatic cancer. The biopsy results confirm the presence of cancerous cells, although the final stain to confirm the origin from the pancreas is pending. The tumor is located at the tail of the pancreas and measures 4.1 cm, encasing the splenic artery and vein. A liver lesion was also noted and biopsied. The patient has experienced significant weight loss and early satiety. He is currently taking oxycodone for pain management and uses a laxative to prevent constipation.    Treatment Plan:  A blood test kit was provided to check for mutations that could be targeted with therapy. Tissue testing from the recent biopsy will also be conducted. If no mutations are found and the cancer is confirmed as pancreatic adenocarcinoma, a standard chemotherapy regimen will be recommended. Chemotherapy aims to prolong life and maintain quality of life. The potential side effects of chemotherapy include nausea, vomiting, fatigue, and increased risk of infection. Supportive care measures such as hydration, avoiding refined sugars and processed foods, and maintaining physical activity were advised. The patient was encouraged to consider clinical trials at [facility] or other institutions for potentially better treatment options.    Risks and Benefits:  The risks, benefits, and alternatives of treatment were thoroughly discussed. Chemotherapy can help control the cancer and improve symptoms but comes with side effects such as nausea, vomiting, fatigue, and increased risk of infection. Targeted therapy, if applicable, is preferred due to its effectiveness and lower toxicity. Clinical trials may offer access to newer treatments that could be more effective. Hospice care was discussed as an option if the patient decides against active treatment, but it is not recommended at this stage due to the patient's willingness to pursue treatment.    Follow-up:  A follow-up appointment will be scheduled in 2 days to discuss the final biopsy results and confirm the treatment plan.    This is a 70 y.o. 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    History of Present Illness   Chief Complaint   Patient presents with    Consult     History of Present Illness  The patient presents for pancreatic cancer. He is accompanied by his son and daughter.    He was diagnosed with pancreatic cancer on 06/15/2025, following a biopsy. Despite the diagnosis, he continues to work as a  and maintains an active lifestyle, including regular walking. His daily activities, such as climbing stairs, remain unaffected. He first noticed symptoms in 05/2025, which included a decreased appetite and a feeling of fullness after consuming just one beer. This sensation would persist until he lay down, after which he would feel hungry again. These symptoms have been  "ongoing for approximately 2 months. He also reports a weight loss of about 15 pounds, from his usual weight of 178 pounds to 180 pounds in 01/2025. He has not experienced any fevers, shaking chills, nausea, vomiting, lightheadedness, or headaches. He occasionally experiences dizziness when standing up too quickly from a kneeling position. He reports no rashes, itching, blood in urine, or changes in stool color. He has no history of smoking. He is currently under the care of palliative care and is considering clinical trials. He is also contemplating getting a power of . He has been taking oxycodone for pain management and uses [place] laxative tablets to prevent constipation.    He takes amlodipine for high blood pressure.    SOCIAL HISTORY  He does not smoke. He lives alone.    FAMILY HISTORY  He reports no family history of cancer.    Pertinent Medical History     07/07/25: HTN     Review of Systems  Denies F/C, N/V, SOB, CP, LH, HA, rash, itching, gen weakness, melena, hematuria, hematochezia, falls, diarrhea, or constipation        Objective   /66 (BP Location: Left arm, Patient Position: Sitting, Cuff Size: Adult)   Pulse 71   Temp (!) 97.2 °F (36.2 °C) (Temporal)   Resp 16   Ht 5' 9\" (1.753 m)   Wt 73.9 kg (163 lb)   SpO2 99%   BMI 24.07 kg/m²     Pain Screening:  Pain Score: 0-No pain  ECOG   0  Physical Exam  Physical Exam  Cardiovascular: Heart sounds normal, no murmurs or abnormal heart sounds noted.  Respiratory: Clear to auscultation bilaterally, no wheezes, rales, or rhonchi.  Gastrointestinal: No abdominal tenderness on palpation.  Hematologic/Lymphatic: No lower extremity edema.      Physical exam:  General:  Appears in no distress, sitting up  Neuro:  Speaks in full sentences, no focal deficits noted  Pulmonary:  No cyanosis, no accessory muscle use  Cardiovascular: Regular rate, no abnormal rhythm noted  GI:  Appears nondistended, no masses noted  Extremities:  No new rash " noted, no cyanosis  Psychiatry:  Normal mood with congruent affect  Ear nose and throat:  Atraumatic, extraocular muscles intact    _____  Results  Imaging   - CT scan of the chest, abdomen, and pelvis: 06/15/2025, Tumor at the tail of the pancreas measuring 4.1 cm, encasing the splenic artery and vein, and a liver lesion.  Labs: I have reviewed the following labs:  Lab Results   Component Value Date/Time    WBC 11.27 (H) 06/30/2025 09:59 AM    RBC 3.83 (L) 06/30/2025 09:59 AM    Hemoglobin 11.7 (L) 06/30/2025 09:59 AM    Hematocrit 36.5 06/30/2025 09:59 AM    MCV 95 06/30/2025 09:59 AM    MCH 30.5 06/30/2025 09:59 AM    RDW 11.9 06/30/2025 09:59 AM    Platelets 460 (H) 06/30/2025 09:59 AM    Segmented % 72 06/15/2025 10:31 AM    Lymphocytes % 14 06/15/2025 10:31 AM    Monocytes % 11 06/15/2025 10:31 AM    Eosinophils Relative 2 06/15/2025 10:31 AM    Basophils Relative 1 06/15/2025 10:31 AM    Immature Grans % 0 06/15/2025 10:31 AM    Absolute Neutrophils 7.94 (H) 06/15/2025 10:31 AM     Lab Results   Component Value Date/Time    Potassium 3.6 06/15/2025 10:31 AM    Potassium 5 01/21/2025 08:59 AM    Chloride 106 06/15/2025 10:31 AM    Chloride 107 01/21/2025 08:59 AM    Carbon Dioxide 26 01/21/2025 08:59 AM    CO2 25 06/15/2025 10:31 AM    BUN 22 06/15/2025 10:31 AM    BUN 29 (H) 01/21/2025 08:59 AM    Creatinine 1.12 06/15/2025 10:31 AM    Creatinine 1.45 (H) 02/03/2025 08:37 AM    Calcium 9.7 06/15/2025 10:31 AM    Calcium 9.5 01/21/2025 08:59 AM    AST 21 06/15/2025 10:31 AM    AST 16 01/21/2025 08:59 AM    ALT 20 06/15/2025 10:31 AM    ALT 14 01/21/2025 08:59 AM    Alkaline Phosphatase 160 (H) 06/15/2025 10:31 AM    Alkaline Phosphatase 76 01/21/2025 08:59 AM    Total Protein 8.0 06/15/2025 10:31 AM    Protein, Total 7.1 01/21/2025 08:59 AM    Albumin 4.3 06/15/2025 10:31 AM    ALBUMIN 4.5 01/21/2025 08:59 AM    Total Bilirubin 0.48 06/15/2025 10:31 AM    Total Bilirubin 0.6 01/21/2025 08:59 AM    eGFRcr 52  (L) 02/03/2025 08:37 AM    eGFR 66 06/15/2025 10:31 AM       Administrative Statements   I have spent a total time of 46 minutes in caring for this patient on the day of the visit/encounter including Diagnostic results, Prognosis, Counseling / Coordination of care, Documenting in the medical record, Reviewing/placing orders in the medical record (including tests, medications, and/or procedures), and Obtaining or reviewing history  .

## 2025-07-08 ENCOUNTER — PATIENT OUTREACH (OUTPATIENT)
Dept: HEMATOLOGY ONCOLOGY | Facility: CLINIC | Age: 70
End: 2025-07-08

## 2025-07-08 PROCEDURE — 88307 TISSUE EXAM BY PATHOLOGIST: CPT | Performed by: PATHOLOGY

## 2025-07-08 PROCEDURE — 88341 IMHCHEM/IMCYTCHM EA ADD ANTB: CPT | Performed by: PATHOLOGY

## 2025-07-08 PROCEDURE — 88342 IMHCHEM/IMCYTCHM 1ST ANTB: CPT | Performed by: PATHOLOGY

## 2025-07-08 NOTE — PROGRESS NOTES
ONN call back to the pt, we discussed details pertaining to a port, the pt asked if he could still work with a port and work while on treatment. He mentioned going for a second opinion with [facility] and interested in [facility] too. I asked if he was ok that I provided contact info to his daughter who was asking, he granted me permission. I sent him an email with a picture of the port since he was curious as to what this looks like. He did admit his emotions and feeling overwhelmed with all the information discussed at his consult. He is interested in seeking another opinion to see if surgery can be done elsewhere. I explained why surgery sometimes is not the best option depending how the patient presents initially when first diagnosed. He understands. Pt has my contact should he have further questions/concerns.

## 2025-07-09 ENCOUNTER — TELEPHONE (OUTPATIENT)
Dept: HEMATOLOGY ONCOLOGY | Facility: CLINIC | Age: 70
End: 2025-07-09

## 2025-07-09 ENCOUNTER — OFFICE VISIT (OUTPATIENT)
Dept: HEMATOLOGY ONCOLOGY | Facility: CLINIC | Age: 70
End: 2025-07-09
Payer: COMMERCIAL

## 2025-07-09 VITALS
TEMPERATURE: 97.3 F | HEART RATE: 75 BPM | DIASTOLIC BLOOD PRESSURE: 70 MMHG | BODY MASS INDEX: 23.92 KG/M2 | HEIGHT: 69 IN | OXYGEN SATURATION: 99 % | SYSTOLIC BLOOD PRESSURE: 128 MMHG | RESPIRATION RATE: 16 BRPM | WEIGHT: 161.5 LBS

## 2025-07-09 DIAGNOSIS — C25.9 PANCREATIC ADENOCARCINOMA (HCC): Primary | Chronic | ICD-10-CM

## 2025-07-09 PROCEDURE — 99215 OFFICE O/P EST HI 40 MIN: CPT | Performed by: INTERNAL MEDICINE

## 2025-07-09 PROCEDURE — G2211 COMPLEX E/M VISIT ADD ON: HCPCS | Performed by: INTERNAL MEDICINE

## 2025-07-09 NOTE — ASSESSMENT & PLAN NOTE
This is a 70 y.o. M c HTN who is diagnosed with metastatic pancreatic adenocarincoma, awaiting treatment

## 2025-07-09 NOTE — PROGRESS NOTES
Oncology Teach:   Review of Plan:  Diagnosis Reviewed, Treatment Plan Reviewed and Treatment Schedule Reviewed    Notes:  RN reviewed educational handout for NALIRIFOX. Reviewed possible side effects with lab and infusion recommendations.  Reviewed office handout.    Port information and elastometric ball handout reviewed.    Copy provided to patient's daughter.     Consent obtained. Copy given to patient and uploaded into patient chart.     Review of Pre-Treatment Needs:  Transportation and Lab work frequency reviewed    PICC or Port Placement Needs:  Port needed, reviewed w/ pt    If PICC needed, was consent obtained?:  No    Expectations at First Appointment Reviewed:  Yes    Patient Medication Education Reviewed:  Yes    Supportive Medication Reviewed:  OTC reviewed, Rx meds reviewed and Confirmed Rx sent to pharmacy    Review when to call office vs. present to ED:  Yes    Provided Oncology Access Center Number:  Yes    Assessment of patient understanding:  Pt demonstrates understanding    Chemo consent obtained?:  Yes

## 2025-07-09 NOTE — PROGRESS NOTES
Name: Timo Sandoval      : 1955      MRN: [id number]  Encounter Provider: Nahid Prieto MD  Encounter Date: 2025   Encounter department: [facility]  :  Assessment & Plan  Pancreatic adenocarcinoma (HCC)  This is a 70 y.o. M c HTN who is diagnosed with metastatic pancreatic adenocarincoma, awaiting treatment         Assessment & Plan  1. Stage IV pancreatic adenocarcinoma.  The biopsy results confirm the diagnosis of stage IV pancreatic adenocarcinoma, indicating metastasis from the pancreas to the liver. The treatment plan includes the placement of a port on 2025, followed by the initiation of chemotherapy the day after. Chemotherapy sessions will be scheduled every two weeks, with blood work conducted the day before each session to monitor kidney and liver function and ensure blood counts are within safe limits. The patient was advised to maintain a healthy lifestyle, including regular walking and avoiding refined sugars and processed foods. Alcohol consumption should be minimized.     The patient was encouraged to consult with [facility] regarding their clinical trial, which aims to increase survival with a potentially more tolerable treatment than standard chemotherapy. The clinical trial involves a combination of effective chemotherapy and a trial drug. The patient has the option to withdraw from the trial at any time if it becomes intolerable or inconvenient. If the patient decides to participate in the clinical trial, the current treatment plan can be adjusted accordingly. The patient was advised to contact the clinic by the end of the week with any questions or concerns.    This is a 70 y.o. M c HTN who is diagnosed with metastatic pancreatic adenocarincoma, awaiting treatment    F/u in 2 days to determine next steps  He will get a second opinion with [facility] tomorrow as there are clinical trials options  [facility] NGS ordered  TRACIE CERNA off of  the liver bx is ordered  F/u palliative care (Dr. Harper)    'liposomal irinotecan plus 5-Fuand oxliplatin is the plan as this is a metastatic (NARIFOX) process: infusion chairs are ordered    F/u: q2 weeks if chemo is being pursued here    No follow-ups on file.    History of Present Illness   Chief Complaint   Patient presents with    Follow-up     History of Present Illness  The patient presents for pancreatic cancer.     He was diagnosed with pancreatic cancer on 06/15/2025, following a biopsy. Despite the diagnosis, he continues to work as a  and maintains an active lifestyle, including regular walking. His daily activities, such as climbing stairs, remain unaffected. He first noticed symptoms in 05/2025, which included a decreased appetite and a feeling of fullness after consuming just one beer. This sensation would persist until he lay down, after which he would feel hungry again. These symptoms have been ongoing for approximately 2 months. He also reports a weight loss of about 15 pounds, from his usual weight of 178 pounds to 180 pounds in 01/2025. He has not experienced any fevers, shaking chills, nausea, vomiting, lightheadedness, or headaches. He occasionally experiences dizziness when standing up too quickly from a kneeling position. He reports no rashes, itching, blood in urine, or changes in stool color. He has no history of smoking. He is currently under the care of palliative care and is considering clinical trials. He is also contemplating getting a power of . He has been taking oxycodone for pain management and uses [place] laxative tablets to prevent constipation.    He takes amlodipine for high blood pressure.    SOCIAL HISTORY  He does not smoke. He lives alone.    FAMILY HISTORY  He reports no family history of cancer.    6/30/2025 liver bx  Liver mass, core needle biopsy:  Adenocarcinoma, likely met from the pancreas      The patient presents for pancreatic cancer,  "specifically adenocarcinoma, which has metastasized to the liver, making it stage IV.    He is making efforts to maintain his health, including ensuring adequate sleep and engaging in daily walks. He is considering moderate work to keep himself occupied, as his work schedule is irregular and involves physical labor.     An appointment with an interventional radiologist is scheduled for 07/21/2025 to have a port placed. He inquired about the number of infusions he will receive and whether they will be administered weekly.     He has contacted [facility], which currently has a clinical trial available. However, he is contemplating staying local due to concerns about the physical toll of travel. A consultation with [facility] is scheduled for tomorrow.    Pertinent Medical History     07/09/25: HTN     Review of Systems  Denies F/C, N/V, SOB, CP, LH, HA, rash, itching, gen weakness, melena, hematuria, hematochezia, falls, diarrhea, or constipation        Objective   /70 (BP Location: Left arm, Patient Position: Sitting, Cuff Size: Adult)   Pulse 75   Temp (!) 97.3 °F (36.3 °C) (Temporal)   Resp 16   Ht 5' 9\" (1.753 m)   Wt 73.3 kg (161 lb 8 oz)   SpO2 99%   BMI 23.85 kg/m²     Pain Screening:  Pain Score: 0-No pain  ECOG   0  Physical Exam  Physical Exam  Cardiovascular: Heart sounds normal, no murmurs or abnormal heart sounds noted.  Respiratory: Clear to auscultation bilaterally, no wheezes, rales, or rhonchi.  Gastrointestinal: No abdominal tenderness on palpation.  Hematologic/Lymphatic: No lower extremity edema.          Physical exam:  General:  Appears in no distress, sitting up  Neuro:  Speaks in full sentences, no focal deficits noted  Pulmonary:  No cyanosis, no accessory muscle use  Cardiovascular: Regular rate, no abnormal rhythm noted  GI:  Appears nondistended, no masses noted  Extremities:  No new rash noted, no cyanosis  Psychiatry:  Normal mood with congruent affect  Ear nose and throat:  " Atraumatic, extraocular muscles intact    _____  Results  Imaging   - CT scan of the chest, abdomen, and pelvis: 06/15/2025, Tumor at the tail of the pancreas measuring 4.1 cm, encasing the splenic artery and vein, and a liver lesion.      Diagnostic Testing   - Biopsy: Confirmed pancreatic adenocarcinoma that has metastasized to the liver, making it stage IV cancer.  Labs: I have reviewed the following labs:  Lab Results   Component Value Date/Time    WBC 11.27 (H) 06/30/2025 09:59 AM    RBC 3.83 (L) 06/30/2025 09:59 AM    Hemoglobin 11.7 (L) 06/30/2025 09:59 AM    Hematocrit 36.5 06/30/2025 09:59 AM    MCV 95 06/30/2025 09:59 AM    MCH 30.5 06/30/2025 09:59 AM    RDW 11.9 06/30/2025 09:59 AM    Platelets 460 (H) 06/30/2025 09:59 AM    Segmented % 72 06/15/2025 10:31 AM    Lymphocytes % 14 06/15/2025 10:31 AM    Monocytes % 11 06/15/2025 10:31 AM    Eosinophils Relative 2 06/15/2025 10:31 AM    Basophils Relative 1 06/15/2025 10:31 AM    Immature Grans % 0 06/15/2025 10:31 AM    Absolute Neutrophils 7.94 (H) 06/15/2025 10:31 AM     Lab Results   Component Value Date/Time    Potassium 3.6 06/15/2025 10:31 AM    Potassium 5 01/21/2025 08:59 AM    Chloride 106 06/15/2025 10:31 AM    Chloride 107 01/21/2025 08:59 AM    Carbon Dioxide 26 01/21/2025 08:59 AM    CO2 25 06/15/2025 10:31 AM    BUN 22 06/15/2025 10:31 AM    BUN 29 (H) 01/21/2025 08:59 AM    Creatinine 1.12 06/15/2025 10:31 AM    Creatinine 1.45 (H) 02/03/2025 08:37 AM    Calcium 9.7 06/15/2025 10:31 AM    Calcium 9.5 01/21/2025 08:59 AM    AST 21 06/15/2025 10:31 AM    AST 16 01/21/2025 08:59 AM    ALT 20 06/15/2025 10:31 AM    ALT 14 01/21/2025 08:59 AM    Alkaline Phosphatase 160 (H) 06/15/2025 10:31 AM    Alkaline Phosphatase 76 01/21/2025 08:59 AM    Total Protein 8.0 06/15/2025 10:31 AM    Protein, Total 7.1 01/21/2025 08:59 AM    Albumin 4.3 06/15/2025 10:31 AM    ALBUMIN 4.5 01/21/2025 08:59 AM    Total Bilirubin 0.48 06/15/2025 10:31 AM    Total  Bilirubin 0.6 01/21/2025 08:59 AM    eGFRcr 52 (L) 02/03/2025 08:37 AM    eGFR 66 06/15/2025 10:31 AM       Administrative Statements   I have spent a total time of 41 minutes in caring for this patient on the day of the visit/encounter including Diagnostic results, Prognosis, Counseling / Coordination of care, Documenting in the medical record, Reviewing/placing orders in the medical record (including tests, medications, and/or procedures), and Obtaining or reviewing history  .

## 2025-07-10 DIAGNOSIS — C25.9 PANCREATIC ADENOCARCINOMA (HCC): Primary | ICD-10-CM

## 2025-07-10 NOTE — TELEPHONE ENCOUNTER
Called and spoke to patient directly. Patient requested I contact his daughter to complete scheduling process. Called daughter, Liliana. Patient scheduled to begin treatment 7/22 at [facility]. Port placement 7/21. Reviewed lab requirements and full schedule of appointments. Answered any questions. Patient's daughter expressed understanding.

## 2025-07-11 DIAGNOSIS — R16.0 LIVER MASS: ICD-10-CM

## 2025-07-11 DIAGNOSIS — K86.89 MASS OF PANCREAS: ICD-10-CM

## 2025-07-11 NOTE — TELEPHONE ENCOUNTER
Reason for call:   [x] Refill   [] Prior Auth  [x] Other: Previously prescribed by different provider. If prescription can be refilled until Palliative Care takes over. Patient has scheduled appointment with Palliative Care on 8/5/25 and HEM ON[facility] on 8/7/25. Please contact patient at your earliest convenience [phone] to confirm when prescription will be ready for  at pharmacy. Daughter will be away on vacation.     Office:   [] PCP/Provider -   [x] Specialty/Provider - [facility] Dorcas Prieto MD      Medication: oxyCODONE-acetaminophen (PERCOCET) 5-325 mg per tablet Take 1 tablet by mouth every 8 (eight) hours as needed for moderate pain for up to 10 days     Quantity: 30 tablet     Pharmacy: [id number] [facility]   Does the patient have enough for 3 days?   [] Yes   [x] No - Send as HP to POD    Mail Away Pharmacy   Does the patient have enough for 10 days?   [] Yes   [] No - Send as HP to POD

## 2025-07-14 ENCOUNTER — TELEPHONE (OUTPATIENT)
Dept: INTERVENTIONAL RADIOLOGY/VASCULAR | Facility: HOSPITAL | Age: 70
End: 2025-07-14

## 2025-07-14 LAB
CARIS GENOMIC LOH - EXOME: NORMAL
CARIS HER2/NEU: NORMAL
CARIS HLA-A: NORMAL
CARIS HLA-B: NORMAL
CARIS HLA-C: NORMAL
CARIS MSI - EXOME: NORMAL
CARIS PD-L1 (SP142): NEGATIVE
CARIS TMB - EXOME: NORMAL

## 2025-07-14 RX ORDER — CEFAZOLIN SODIUM 2 G/50ML
2000 SOLUTION INTRAVENOUS ONCE
Status: CANCELLED | OUTPATIENT
Start: 2025-07-14 | End: 2025-07-14

## 2025-07-14 RX ORDER — OXYCODONE AND ACETAMINOPHEN 5; 325 MG/1; MG/1
1 TABLET ORAL EVERY 8 HOURS PRN
Qty: 30 TABLET | Refills: 0 | OUTPATIENT
Start: 2025-07-14 | End: 2025-07-24

## 2025-07-14 NOTE — TELEPHONE ENCOUNTER
Medication: oxycodone hydrochloride 5-325mg  PDMP    06/30/2025 06/30/2025 06/30/2025 oxyCODONE HYDROCHLORIDE 5 MG ORAL TABLET/ACETAMINOPHEN 325 MG (Tablet) 30.0 10 325 MG-5 MG 22.50 JACK SULLIVAN [id number] Medicare 0 / 0 PA   1 [id number] 06/20/2025 06/20/2025 06/20/2025 ALPRAZolam (Tablet) 30.0 30 0.25 MG NA VANESA MENARD [id number] Medicare 0 / 0 PA   1 [id number] 06/15/2025 06/15/2025 06/15/2025 oxyCODONE HYDROCHLORIDE 5 MG ORAL TABLET/ACETAMINOPHEN 325 MG (Tablet) 12.0 4 325 MG-5 MG 22.50 JUAN JOSE MEYERS

## 2025-07-15 ENCOUNTER — TELEPHONE (OUTPATIENT)
Dept: PALLIATIVE MEDICINE | Facility: CLINIC | Age: 70
End: 2025-07-15

## 2025-07-15 ENCOUNTER — PATIENT OUTREACH (OUTPATIENT)
Dept: HEMATOLOGY ONCOLOGY | Facility: CLINIC | Age: 70
End: 2025-07-15

## 2025-07-15 ENCOUNTER — TELEPHONE (OUTPATIENT)
Dept: INTERVENTIONAL RADIOLOGY/VASCULAR | Facility: HOSPITAL | Age: 70
End: 2025-07-15

## 2025-07-15 DIAGNOSIS — Z95.828 PORT-A-CATH IN PLACE: ICD-10-CM

## 2025-07-15 DIAGNOSIS — C25.9 PANCREATIC ADENOCARCINOMA (HCC): Primary | ICD-10-CM

## 2025-07-15 DIAGNOSIS — T45.1X5A CHEMOTHERAPY INDUCED NEUTROPENIA (HCC): ICD-10-CM

## 2025-07-15 DIAGNOSIS — T45.1X5A CHEMOTHERAPY-INDUCED NAUSEA: Primary | ICD-10-CM

## 2025-07-15 DIAGNOSIS — D70.1 CHEMOTHERAPY INDUCED NEUTROPENIA (HCC): ICD-10-CM

## 2025-07-15 DIAGNOSIS — R11.0 CHEMOTHERAPY-INDUCED NAUSEA: Primary | ICD-10-CM

## 2025-07-15 RX ORDER — LIDOCAINE AND PRILOCAINE 25; 25 MG/G; MG/G
CREAM TOPICAL AS NEEDED
Qty: 30 G | Refills: 1 | Status: SHIPPED | OUTPATIENT
Start: 2025-07-15

## 2025-07-15 RX ORDER — SODIUM CHLORIDE 9 MG/ML
20 INJECTION, SOLUTION INTRAVENOUS ONCE AS NEEDED
OUTPATIENT
Start: 2025-08-13

## 2025-07-15 RX ORDER — DEXTROSE MONOHYDRATE 50 MG/ML
20 INJECTION, SOLUTION INTRAVENOUS ONCE
OUTPATIENT
Start: 2025-08-27

## 2025-07-15 RX ORDER — DEXTROSE MONOHYDRATE 50 MG/ML
20 INJECTION, SOLUTION INTRAVENOUS ONCE
Status: CANCELLED | OUTPATIENT
Start: 2025-07-22

## 2025-07-15 RX ORDER — SODIUM CHLORIDE 9 MG/ML
20 INJECTION, SOLUTION INTRAVENOUS ONCE AS NEEDED
OUTPATIENT
Start: 2025-09-10

## 2025-07-15 RX ORDER — DEXTROSE MONOHYDRATE 50 MG/ML
20 INJECTION, SOLUTION INTRAVENOUS ONCE
OUTPATIENT
Start: 2025-08-13

## 2025-07-15 RX ORDER — ATROPINE SULFATE 1 MG/ML
0.25 INJECTION, SOLUTION INTRAVENOUS ONCE
OUTPATIENT
Start: 2025-08-27

## 2025-07-15 RX ORDER — ATROPINE SULFATE 1 MG/ML
0.25 INJECTION, SOLUTION INTRAVENOUS ONCE
OUTPATIENT
Start: 2025-08-13

## 2025-07-15 RX ORDER — ATROPINE SULFATE 1 MG/ML
0.25 INJECTION, SOLUTION INTRAVENOUS ONCE AS NEEDED
OUTPATIENT
Start: 2025-08-13

## 2025-07-15 RX ORDER — ATROPINE SULFATE 1 MG/ML
0.25 INJECTION, SOLUTION INTRAVENOUS ONCE AS NEEDED
Status: CANCELLED | OUTPATIENT
Start: 2025-07-22

## 2025-07-15 RX ORDER — ATROPINE SULFATE 1 MG/ML
0.25 INJECTION, SOLUTION INTRAVENOUS ONCE
Status: CANCELLED | OUTPATIENT
Start: 2025-07-22

## 2025-07-15 RX ORDER — ATROPINE SULFATE 1 MG/ML
0.25 INJECTION, SOLUTION INTRAVENOUS ONCE
OUTPATIENT
Start: 2025-09-10

## 2025-07-15 RX ORDER — OXYCODONE AND ACETAMINOPHEN 5; 325 MG/1; MG/1
1 TABLET ORAL EVERY 8 HOURS PRN
Qty: 20 TABLET | Refills: 0 | Status: SHIPPED | OUTPATIENT
Start: 2025-07-15 | End: 2025-07-25

## 2025-07-15 RX ORDER — ONDANSETRON 4 MG/1
4 TABLET, FILM COATED ORAL EVERY 8 HOURS PRN
Qty: 20 TABLET | Refills: 0 | Status: SHIPPED | OUTPATIENT
Start: 2025-07-15

## 2025-07-15 RX ORDER — ATROPINE SULFATE 1 MG/ML
0.25 INJECTION, SOLUTION INTRAVENOUS ONCE AS NEEDED
OUTPATIENT
Start: 2025-08-27

## 2025-07-15 RX ORDER — SODIUM CHLORIDE 9 MG/ML
20 INJECTION, SOLUTION INTRAVENOUS ONCE AS NEEDED
OUTPATIENT
Start: 2025-08-27

## 2025-07-15 RX ORDER — SODIUM CHLORIDE 9 MG/ML
20 INJECTION, SOLUTION INTRAVENOUS ONCE AS NEEDED
Status: CANCELLED | OUTPATIENT
Start: 2025-07-22

## 2025-07-15 RX ORDER — ATROPINE SULFATE 1 MG/ML
0.25 INJECTION, SOLUTION INTRAVENOUS ONCE AS NEEDED
OUTPATIENT
Start: 2025-09-10

## 2025-07-15 RX ORDER — DEXTROSE MONOHYDRATE 50 MG/ML
20 INJECTION, SOLUTION INTRAVENOUS ONCE
OUTPATIENT
Start: 2025-09-10

## 2025-07-16 ENCOUNTER — OFFICE VISIT (OUTPATIENT)
Dept: PALLIATIVE MEDICINE | Facility: CLINIC | Age: 70
End: 2025-07-16
Payer: COMMERCIAL

## 2025-07-16 ENCOUNTER — DOCUMENTATION (OUTPATIENT)
Dept: PALLIATIVE MEDICINE | Facility: CLINIC | Age: 70
End: 2025-07-16

## 2025-07-16 ENCOUNTER — TELEPHONE (OUTPATIENT)
Dept: HEMATOLOGY ONCOLOGY | Facility: CLINIC | Age: 70
End: 2025-07-16

## 2025-07-16 VITALS
WEIGHT: 160 LBS | RESPIRATION RATE: 18 BRPM | SYSTOLIC BLOOD PRESSURE: 130 MMHG | DIASTOLIC BLOOD PRESSURE: 64 MMHG | HEART RATE: 84 BPM | HEIGHT: 69 IN | BODY MASS INDEX: 23.7 KG/M2 | TEMPERATURE: 98.4 F | OXYGEN SATURATION: 98 %

## 2025-07-16 DIAGNOSIS — Z51.5 PALLIATIVE CARE BY SPECIALIST: ICD-10-CM

## 2025-07-16 DIAGNOSIS — C25.9 PANCREATIC CANCER METASTASIZED TO LIVER (HCC): Primary | ICD-10-CM

## 2025-07-16 DIAGNOSIS — C78.7 PANCREATIC CANCER METASTASIZED TO LIVER (HCC): Primary | ICD-10-CM

## 2025-07-16 DIAGNOSIS — G89.3 CANCER RELATED PAIN: ICD-10-CM

## 2025-07-16 DIAGNOSIS — T40.2X5A THERAPEUTIC OPIOID-INDUCED CONSTIPATION (OIC): ICD-10-CM

## 2025-07-16 DIAGNOSIS — K59.03 THERAPEUTIC OPIOID-INDUCED CONSTIPATION (OIC): ICD-10-CM

## 2025-07-16 PROCEDURE — 99215 OFFICE O/P EST HI 40 MIN: CPT | Performed by: STUDENT IN AN ORGANIZED HEALTH CARE EDUCATION/TRAINING PROGRAM

## 2025-07-16 PROCEDURE — G2211 COMPLEX E/M VISIT ADD ON: HCPCS | Performed by: STUDENT IN AN ORGANIZED HEALTH CARE EDUCATION/TRAINING PROGRAM

## 2025-07-16 RX ORDER — ACETAMINOPHEN 500 MG
1000 TABLET ORAL 3 TIMES DAILY
Start: 2025-07-16

## 2025-07-16 RX ORDER — OXYCODONE HYDROCHLORIDE 5 MG/1
2.5-5 TABLET ORAL EVERY 6 HOURS PRN
Qty: 60 TABLET | Refills: 0 | Status: SHIPPED | OUTPATIENT
Start: 2025-07-16

## 2025-07-16 RX ORDER — POLYETHYLENE GLYCOL 3350 17 G/17G
17 POWDER, FOR SOLUTION ORAL DAILY PRN
Start: 2025-07-16

## 2025-07-16 NOTE — PROGRESS NOTES
Palliative Outpatient Assessment of Need    LSW completed an assessment of need which was completed with patient in the office.    Relationship status:   Duration of relationship:   Name of significant other:  Children and Ages: 2 children, Liliana dtr and Timo son, he also has a grandchild  Pets: no  Other important family information:  Living situation (where and whom): Pt lives in private residence    Patient's primary caregiver:  his children are very supportive.  Any limitations of caregiver: they both work  Environmental concerns or barriers: not at this time   history: no  Employment history/source of income: Pt is a self employed   Disability:  this diagnosis has set him back but he is hopeful to bounce back once the tx starts having an effect and hopefully knock the CA back.  Concerns regarding literacy: no  Spirituality/ Mu-ism:  believes in god, is [employer]  Patient's strengths, social supports, and resources: Pt reports having a good support system between his kids and his friends.  Cultural information: lived in [place] for part of his life  Mental Health current or previous: pt reports some anxiety with the onset of his diagnosis  Substance use or history: pt reports having drank beer in the past  Sleep:sleeps ok until he has pain. Has been taking pain meds to help him sleep better  Exercise: working, also likes to walk  Diet/nutrition: Pt reports he is losing wgt, however he is continuing to try to eat  Durable Medical Equipment needs: not at this time  Transportation: self, or family  Financial concerns: pt is self employed  Advanced Directive:son is POA, will bring in paperwork  Other medical or social work providers involved: oncology  Patient/caregiver current level of coping: the diagnosis is still pretty fresh for him, Pt reports it was a surprise that he did not see any signs of it coming. He does appear to be making an adjustment because he seems better than the  first time we met.   Understanding: Pt getting a better understanding as he goes along with his diagnosis and treatment journey.  Patient/family concerns and areas of need: pain control  Patient's interests: playing guitar, walking, old time cartoons, vacation in [place]    I have spent 50 minutes with Patient  today in which greater than 50% of this time was spent in counseling/coordination of care.    *All questions may not be answered due to constraints.  Follow-up discussions may need to occur.

## 2025-07-16 NOTE — ASSESSMENT & PLAN NOTE
Re-introduced palliative care and the services we provide  SW assessment performed, see separate note   Offered LCSW, patient declined  Time spent developing patient-provider relationship, providing psychosocial support, and validating patient experience  Encouraged to call the office with any questions/concerns  Follow up in 1 month or sooner as needed

## 2025-07-16 NOTE — ASSESSMENT & PLAN NOTE
Following with oncology, plan for palliative chemotherapy starting next week  Symptom management as below

## 2025-07-16 NOTE — TELEPHONE ENCOUNTER
Rohit results obtained.  Uploaded into patient chart.    Appointment with Dr. Prieto: 8/7  Infusion appointment: 7/22

## 2025-07-16 NOTE — ASSESSMENT & PLAN NOTE
Schedule tylenol with low dose oxycodone as needed  Narcan sent, opioid agreement signed, UDS obtained.    Orders:    MM ALL_Prescribed Meds and Special Instructions    MM DT_Alprazolam Definitive Test    MM DT_Amphetamine Definitive Test    MM DT_Aripiprazole Definitive Test    MM DT_Buprenorphine Definitive Test    MM DT_Bupropion Definitive Test    MM DT_Carisoprodol Definitive Test    MM DT_Citalopram/Escitalopram Definitive Test    MM DT_Clonazepam Definitive Test    MM DT_Clozapine Definitive Test    MM DT_Cocaine Definitive Test    MM DT_Codeine Definitive Test    MM DT_Desipramine Definitive Test    MM DT_Dextromethorphan Definitive Test    MM Diazepam Definitive Test    MM DT_Ethyl Glucuronide/Ethyl Sulfate Definitive Test    MM DT_Fentanyl Definitive Test    MM DT_Heroin Definitive Test    MM DT_Hydrocodone Definitive Test    MM DT_Hydromorphone Definitive Test    MM DT_Kratom Definitive Test    MM DT_Levorphanol Definitive Test    MM Lorazepam Definitive Test    MM DT_MDMA Definitive Test    MM DT_Methadone Definitive Test    MM DT_Methamphetaine-d/l Isomers Definitive    MM DT_Methamphetamine Definitive Test    MM DT_Methylphenidate Definitive Test    MM DT_Morphine Definitive Test    MM DT_Naltrexone Definitive Test    MM DT_Olanzapine Definitive Test    MM DT_Oxazepam Definitive Test    MM DT_Oxycodone Definitive Test    MM DT_Oxymorphone Definitive Test    MM DT_Paroxetine Definitive Test    MM DT_Phencyclidine Definitive Test    MM DT_Phenobarbital Definitive Test    MM DT_Phentermine Definitive Test    MM DT_Quetiapine Definitive Test    MM DT_Risperidone Definitive Test    MM DT_Spice Definitive Test    MM DT_Tapentadol Definitive Test    MM DT_Temazapam Definitive Test    MM DT_THC Definitive Test    MM DT_Tramadol Definitive Test    MM DT_Validity Creatinine    MM DT_Validity Oxidant    MM DT_Validity pH    MM DT_Validity Specific    MM DT_Zolpidem Definitive Test    acetaminophen (TYLENOL) 500  mg tablet; Take 2 tablets (1,000 mg total) by mouth 3 (three) times a day    oxyCODONE (Roxicodone) 5 immediate release tablet; Take 0.5-1 tablets (2.5-5 mg total) by mouth every 6 (six) hours as needed for moderate pain or severe pain Max Daily Amount: 20 mg    naloxone (NARCAN) 4 mg/0.1 mL nasal spray; Administer 1 spray into a nostril. If no response after 2-3 minutes, give another dose in the other nostril using a new spray.

## 2025-07-16 NOTE — PROGRESS NOTES
Name: Timo Sandoval      : 1955      MRN: [id number]  Encounter Provider: Juliana Harper MD  Encounter Date: 2025   Encounter department: [facility] CARE [place]  :  Assessment & Plan  Pancreatic cancer metastasized to liver (HCC)  Following with oncology, plan for palliative chemotherapy starting next week  Symptom management as below       Cancer related pain  Schedule tylenol with low dose oxycodone as needed  Narcan sent, opioid agreement signed, UDS obtained.    Orders:    MM ALL_Prescribed Meds and Special Instructions    MM DT_Alprazolam Definitive Test    MM DT_Amphetamine Definitive Test    MM DT_Aripiprazole Definitive Test    MM DT_Buprenorphine Definitive Test    MM DT_Bupropion Definitive Test    MM DT_Carisoprodol Definitive Test    MM DT_Citalopram/Escitalopram Definitive Test    MM DT_Clonazepam Definitive Test    MM DT_Clozapine Definitive Test    MM DT_Cocaine Definitive Test    MM DT_Codeine Definitive Test    MM DT_Desipramine Definitive Test    MM DT_Dextromethorphan Definitive Test    MM Diazepam Definitive Test    MM DT_Ethyl Glucuronide/Ethyl Sulfate Definitive Test    MM DT_Fentanyl Definitive Test    MM DT_Heroin Definitive Test    MM DT_Hydrocodone Definitive Test    MM DT_Hydromorphone Definitive Test    MM DT_Kratom Definitive Test    MM DT_Levorphanol Definitive Test    MM Lorazepam Definitive Test    MM DT_MDMA Definitive Test    MM DT_Methadone Definitive Test    MM DT_Methamphetaine-d/l Isomers Definitive    MM DT_Methamphetamine Definitive Test    MM DT_Methylphenidate Definitive Test    MM DT_Morphine Definitive Test    MM DT_Naltrexone Definitive Test    MM DT_Olanzapine Definitive Test    MM DT_Oxazepam Definitive Test    MM DT_Oxycodone Definitive Test    MM DT_Oxymorphone Definitive Test    MM DT_Paroxetine Definitive Test    MM DT_Phencyclidine Definitive Test    MM DT_Phenobarbital Definitive Test    MM DT_Phentermine Definitive Test    MM  DT_Quetiapine Definitive Test    MM DT_Risperidone Definitive Test    MM DT_Spice Definitive Test    MM DT_Tapentadol Definitive Test    MM DT_Temazapam Definitive Test    MM DT_THC Definitive Test    MM DT_Tramadol Definitive Test    MM DT_Validity Creatinine    MM DT_Validity Oxidant    MM DT_Validity pH    MM DT_Validity Specific    MM DT_Zolpidem Definitive Test    acetaminophen (TYLENOL) 500 mg tablet; Take 2 tablets (1,000 mg total) by mouth 3 (three) times a day    oxyCODONE (Roxicodone) 5 immediate release tablet; Take 0.5-1 tablets (2.5-5 mg total) by mouth every 6 (six) hours as needed for moderate pain or severe pain Max Daily Amount: 20 mg    naloxone (NARCAN) 4 mg/0.1 mL nasal spray; Administer 1 spray into a nostril. If no response after 2-3 minutes, give another dose in the other nostril using a new spray.    Therapeutic opioid-induced constipation (OIC)  Discussed titration of miralax to achieve regular soft stools    Orders:    polyethylene glycol (GLYCOLAX) 17 GM/SCOOP powder; Take 17 g by mouth daily as needed (constipation)    Palliative care by specialist  Re-introduced palliative care and the services we provide  SW assessment performed, see separate note   Offered LCSW, patient declined  Time spent developing patient-provider relationship, providing psychosocial support, and validating patient experience  Encouraged to call the office with any questions/concerns  Follow up in 1 month or sooner as needed           Decisional apparatus: Patient is competent on my exam today. If competence is lost, patient's substitute decision maker would default to his son per his advance directive  Advance Directive / Living Will / POLST: To bring next visit    PDMP Review: I have reviewed the patient's controlled substance dispensing history in the Prescription Drug Monitoring Program in compliance with the [place] regulations before prescribing any controlled substances.    History of Present Illness     Timo  "Jaime is a 69 yo M with metastatic pancreatic cancer who presents for palliative care follow up. He was seen in consultation a few weeks ago at which time he did not wish to follow with our team. However, in the interim he had a liver biopsy that confirmed metastatic disease and has since decided to formally establish care for ongoing symptom management.    He has been taking percocet occasionally for abdominal pain which is effective. He finds the xanax makes him more anxious and he does not want to take it going forward. He is working to eat small frequent meals but is still losing weight. He has the most discomfort before he eats.    He did speak with [facility] and feels the clinical trial would be more stressful for him. He is having his port placed next week and then his treatments will start the next day.    His son lives about 4 miles from him and is a significant support. His daughter is his main form of transportation and will be taking him to his treatments. He has 1 grandchild He works as a  and is hoping to be able to do at least some work if possible as he feels it keeps his \"mind right.\" He has been  twice.     Objective   /64   Pulse 84   Temp 98.4 °F (36.9 °C)   Resp 18   Ht 5' 9\" (1.753 m)   Wt 72.6 kg (160 lb)   SpO2 98%   BMI 23.63 kg/m²     Physical Exam  Constitutional:       General: He is not in acute distress.     Appearance: He is not ill-appearing.   Pulmonary:      Effort: Pulmonary effort is normal. No respiratory distress.     Skin:     General: Skin is warm and dry.      Coloration: Skin is not pale.     Neurological:      General: No focal deficit present.      Mental Status: He is alert.     Psychiatric:         Mood and Affect: Mood normal.         Behavior: Behavior normal.         Recent labs:  Lab Results   Component Value Date/Time    SODIUM 139 06/15/2025 10:31 AM    SODIUM 142 01/21/2025 08:59 AM    K 3.6 06/15/2025 10:31 AM    K 5 01/21/2025 08:59 " "AM    BUN 22 06/15/2025 10:31 AM    BUN 29 (H) 01/21/2025 08:59 AM    CREATININE 1.12 06/15/2025 10:31 AM    CREATININE 1.45 (H) 02/03/2025 08:37 AM    GLUC 98 06/15/2025 10:31 AM    GLUC 111 (H) 01/21/2025 08:59 AM    CALCIUM 9.7 06/15/2025 10:31 AM    CALCIUM 9.5 01/21/2025 08:59 AM    AST 21 06/15/2025 10:31 AM    AST 16 01/21/2025 08:59 AM    ALT 20 06/15/2025 10:31 AM    ALT 14 01/21/2025 08:59 AM    ALB 4.3 06/15/2025 10:31 AM    ALB 4.5 01/21/2025 08:59 AM    TP 8.0 06/15/2025 10:31 AM    TP 7.1 01/21/2025 08:59 AM    EGFR 66 06/15/2025 10:31 AM    EGFR 52 (L) 02/03/2025 08:37 AM    EGFR 67 04/15/2020 10:24 AM     Lab Results   Component Value Date/Time    HGB 11.7 (L) 06/30/2025 09:59 AM    WBC 11.27 (H) 06/30/2025 09:59 AM     (H) 06/30/2025 09:59 AM    INR 1.07 06/30/2025 09:59 AM     No results found for: \"[id number]\"    Recent Imaging:  Procedure: IR biopsy liver mass  Result Date: 6/30/2025  Impression: Impression: Successful core  biopsy of right lobe lesion yielding an adequate specimen . A full report will follow. Workstation performed: [id number]     Procedure: X-ray chest 1 view portable  Result Date: 6/16/2025  Impression: No acute cardiopulmonary disease. See subsequent chest CT. Workstation performed: [id number]     Procedure: CT chest abdomen pelvis w contrast  Addendum Date: 6/15/2025  Addendum: ADDENDUM: There is loss of fat plane between the pancreatic tail mass and the spleen at the splenic hilum. Direct splenic invasion cannot be excluded (image 91 series 604).    Result Date: 6/15/2025  Impression: There is a pancreatic lesion suspicious for pancreatic cancer summarized below. Please see body of report for full description: Tumor: Size: 4.1 cm Location: Pancreatic tail. Vascular contact: Encasing and narrowing the splenic artery and vein in the splenic. Metastasis: Hepatic and pulmonary . Hypoattenuation in the anterior spleen likely represents splenic infarct versus metastasis. " Asymmetric enlargement of the prostate. Computerized Assisted Algorithm (CAA) may have aided analysis of applicable images. Resident: Bhavana Parekh I, the attending radiologist, have reviewed the images and agree with the final report above. Workstation performed: [id number]       Administrative Statements   I have spent a total time of 50 minutes in caring for this patient on the day of the visit/encounter including Diagnostic results, Prognosis, Risks and benefits of tx options, Instructions for management, Patient and family education, Impressions, Counseling / Coordination of care, Documenting in the medical record, Reviewing/placing orders in the medical record (including tests, medications, and/or procedures), and Obtaining or reviewing history  .   Topics discussed with the patient / family include symptom assessment and management, medication review, medication adjustment, psychosocial support, opioid titration, supportive listening, and anticipatory guidance.

## 2025-07-16 NOTE — ASSESSMENT & PLAN NOTE
Discussed titration of miralax to achieve regular soft stools    Orders:    polyethylene glycol (GLYCOLAX) 17 GM/SCOOP powder; Take 17 g by mouth daily as needed (constipation)

## 2025-07-17 ENCOUNTER — TELEPHONE (OUTPATIENT)
Dept: HEMATOLOGY ONCOLOGY | Facility: CLINIC | Age: 70
End: 2025-07-17

## 2025-07-17 NOTE — TELEPHONE ENCOUNTER
Guardant results obtained.  Uploaded into patient chart for provider review.    Appointment with Dr. Prieto: 8/7    Infusion appointment: 7/22

## 2025-07-17 NOTE — TELEPHONE ENCOUNTER
Oncology Finance Advocacy Intake and Intervention  Oncology Finance Counselor/Advocate placed call to patient. This writer informed patient that this writer is here to assist patient with billing questions, financial assistance, payment/payment plans, quotes, copayment assistance, insurance optimization, and insurance navigation.    This writer conducted a thorough benefit review of copayment, deductible, and out of pocket cost. This information is documented below and has been reviewed with patient.     Self-Pay Balance: $0  Copayment:$25  Deductible:N/A  Out of Pocket Cost:$7950/7815 Remaining    Patient assistance status: FA Discussed----PT to speak with Daughter on whether to pursue    Interventions:    Writer spoke with PT on 7/17/25 for introductions and to open dialogue regarding financial status. Writer made PT aware of FA application as a possible option to pursue for the upcoming expenses he will incur with his infusion treatments. PT stated he was driving currently and he has been overwhelmed with all of the phone calls/appointments from Dx. Writer made sure the PT felt heard. PT stated he may pursue FA but wishes to speak with his daughter first. Writer provided all contact information to PT for future use.    Information above was reviewed thoroughly with patient. Patient was advised of possible assistance programs/interventions available. If any questions arise, the patient can contact this writer utilizing the information below. This information was given to patient at the time of contact.      Shilo Barbosa  Phone:[phone]  Email:[email or web address]

## 2025-07-17 NOTE — TELEPHONE ENCOUNTER
Left a message requesting callback regarding lab results  Need information about where blood work was done  PT son (Reese) called writer on 7/17/25 @ 3:35 PM. Reese informed writer that the PT would like to pursue FA and inquired how he could go about it. Writer informed Reese of the supporting documents required for the FA application and offered to meet the PT in person during his infusion treatment on 7/22/25. Reese stated he will work on gathering documents for the PT to bring with to his infusion on 7/22/25 to provide to writer for review. Writer confirmed meeting with PT for 7/22/25 and has reminders set. Writer to obtain PT signatures at this time as well. Reese was grateful for the writers support and has all contact information for future use.                Shilo Barbosa  Phone:[phone]  Email:[email or web address]

## 2025-07-18 ENCOUNTER — APPOINTMENT (OUTPATIENT)
Dept: LAB | Facility: CLINIC | Age: 70
End: 2025-07-18
Attending: INTERNAL MEDICINE
Payer: COMMERCIAL

## 2025-07-18 DIAGNOSIS — C25.9 PANCREATIC CANCER METASTASIZED TO LIVER (HCC): ICD-10-CM

## 2025-07-18 DIAGNOSIS — D70.1 CHEMOTHERAPY INDUCED NEUTROPENIA (HCC): ICD-10-CM

## 2025-07-18 DIAGNOSIS — C78.7 PANCREATIC CANCER METASTASIZED TO LIVER (HCC): ICD-10-CM

## 2025-07-18 DIAGNOSIS — T45.1X5A CHEMOTHERAPY INDUCED NEUTROPENIA (HCC): ICD-10-CM

## 2025-07-18 LAB
ALBUMIN SERPL BCG-MCNC: 4 G/DL (ref 3.5–5)
ALP SERPL-CCNC: 331 U/L (ref 34–104)
ALT SERPL W P-5'-P-CCNC: 37 U/L (ref 7–52)
ANION GAP SERPL CALCULATED.3IONS-SCNC: 11 MMOL/L (ref 4–13)
AST SERPL W P-5'-P-CCNC: 36 U/L (ref 13–39)
BASOPHILS # BLD AUTO: 0.07 THOUSANDS/ÂΜL (ref 0–0.1)
BASOPHILS NFR BLD AUTO: 1 % (ref 0–1)
BILIRUB SERPL-MCNC: 0.49 MG/DL (ref 0.2–1)
BUN SERPL-MCNC: 22 MG/DL (ref 5–25)
CALCIUM SERPL-MCNC: 9.4 MG/DL (ref 8.4–10.2)
CHLORIDE SERPL-SCNC: 102 MMOL/L (ref 96–108)
CO2 SERPL-SCNC: 25 MMOL/L (ref 21–32)
CREAT SERPL-MCNC: 1.14 MG/DL (ref 0.6–1.3)
EOSINOPHIL # BLD AUTO: 0.2 THOUSAND/ÂΜL (ref 0–0.61)
EOSINOPHIL NFR BLD AUTO: 2 % (ref 0–6)
ERYTHROCYTE [DISTWIDTH] IN BLOOD BY AUTOMATED COUNT: 12 % (ref 11.6–15.1)
GFR SERPL CREATININE-BSD FRML MDRD: 64 ML/MIN/1.73SQ M
GLUCOSE P FAST SERPL-MCNC: 108 MG/DL (ref 65–99)
HCT VFR BLD AUTO: 35.9 % (ref 36.5–49.3)
HGB BLD-MCNC: 11.3 G/DL (ref 12–17)
IMM GRANULOCYTES # BLD AUTO: 0.06 THOUSAND/UL (ref 0–0.2)
IMM GRANULOCYTES NFR BLD AUTO: 1 % (ref 0–2)
LYMPHOCYTES # BLD AUTO: 1.31 THOUSANDS/ÂΜL (ref 0.6–4.47)
LYMPHOCYTES NFR BLD AUTO: 11 % (ref 14–44)
MAGNESIUM SERPL-MCNC: 2.2 MG/DL (ref 1.9–2.7)
MCH RBC QN AUTO: 30 PG (ref 26.8–34.3)
MCHC RBC AUTO-ENTMCNC: 31.5 G/DL (ref 31.4–37.4)
MCV RBC AUTO: 95 FL (ref 82–98)
MONOCYTES # BLD AUTO: 1.13 THOUSAND/ÂΜL (ref 0.17–1.22)
MONOCYTES NFR BLD AUTO: 9 % (ref 4–12)
NEUTROPHILS # BLD AUTO: 9.27 THOUSANDS/ÂΜL (ref 1.85–7.62)
NEUTS SEG NFR BLD AUTO: 76 % (ref 43–75)
NRBC BLD AUTO-RTO: 0 /100 WBCS
PLATELET # BLD AUTO: 504 THOUSANDS/UL (ref 149–390)
PMV BLD AUTO: 10.6 FL (ref 8.9–12.7)
POTASSIUM SERPL-SCNC: 4.6 MMOL/L (ref 3.5–5.3)
PROT SERPL-MCNC: 7.5 G/DL (ref 6.4–8.4)
RBC # BLD AUTO: 3.77 MILLION/UL (ref 3.88–5.62)
SODIUM SERPL-SCNC: 138 MMOL/L (ref 135–147)
WBC # BLD AUTO: 12.04 THOUSAND/UL (ref 4.31–10.16)

## 2025-07-18 PROCEDURE — 83735 ASSAY OF MAGNESIUM: CPT

## 2025-07-18 PROCEDURE — 80053 COMPREHEN METABOLIC PANEL: CPT

## 2025-07-18 PROCEDURE — 36415 COLL VENOUS BLD VENIPUNCTURE: CPT

## 2025-07-18 PROCEDURE — 85025 COMPLETE CBC W/AUTO DIFF WBC: CPT

## 2025-07-19 LAB
6MAM UR QL CFM: NEGATIVE NG/ML
7AMINOCLONAZEPAM UR QL CFM: NEGATIVE NG/ML
A-OH ALPRAZ UR QL CFM: ABNORMAL NG/ML
ACCEPTABLE CREAT UR QL: NORMAL MG/DL
ACCEPTIBLE SP GR UR QL: NORMAL
AMPHET UR QL CFM: NEGATIVE NG/ML
AMPHET UR QL CFM: NEGATIVE NG/ML
BUPRENORPHINE UR QL CFM: NEGATIVE NG/ML
BZE UR QL CFM: NEGATIVE NG/ML
CARISOPRODOL UR QL CFM: NEGATIVE NG/ML
CODEINE UR QL CFM: NEGATIVE NG/ML
DESIPRAMINE UR QL CFM: NEGATIVE NG/ML
EDDP UR QL CFM: NEGATIVE NG/ML
ETHYL GLUCURONIDE UR QL CFM: NEGATIVE NG/ML
ETHYL SULFATE UR QL SCN: NEGATIVE NG/ML
FENTANYL UR QL CFM: NEGATIVE NG/ML
GLIADIN IGG SER IA-ACNC: NEGATIVE NG/ML
GLUCOSE 30M P 50 G LAC PO SERPL-MCNC: NEGATIVE NG/ML
HYDROCODONE UR QL CFM: NEGATIVE NG/ML
HYDROCODONE UR QL CFM: NEGATIVE NG/ML
HYDROMORPHONE UR QL CFM: NEGATIVE NG/ML
LORAZEPAM UR QL CFM: NEGATIVE NG/ML
MDMA UR QL CFM: NEGATIVE NG/ML
ME-PHENIDATE UR QL CFM: NEGATIVE NG/ML
MEPROBAMATE UR QL CFM: NEGATIVE NG/ML
METHADONE UR QL CFM: NEGATIVE NG/ML
METHAMPHET UR QL CFM: NEGATIVE NG/ML
MORPHINE UR QL CFM: NEGATIVE NG/ML
MORPHINE UR QL CFM: NEGATIVE NG/ML
NALTREXONE UR QL CFM: NEGATIVE NG/ML
NITRITE UR QL: NORMAL UG/ML
NORBUPRENORPHINE UR QL CFM: NEGATIVE NG/ML
NORDIAZEPAM UR QL CFM: NEGATIVE NG/ML
NORFENTANYL UR QL CFM: NEGATIVE NG/ML
NORHYDROCODONE UR QL CFM: NEGATIVE NG/ML
NORHYDROCODONE UR QL CFM: NEGATIVE NG/ML
NOROXYCODONE UR QL CFM: NORMAL NG/ML
OLANZAPINE QUANTIFICATION: NEGATIVE NG/ML
OPC-3373 QUANTIFICATION: NEGATIVE NG/ML
OXAZEPAM UR QL CFM: NEGATIVE NG/ML
OXYCODONE UR QL CFM: NORMAL NG/ML
OXYMORPHONE UR QL CFM: NORMAL NG/ML
OXYMORPHONE UR QL CFM: NORMAL NG/ML
PARA-FLUOROFENTANYL QUANTIFICATION: NORMAL NG/ML
PCP UR QL CFM: NEGATIVE NG/ML
PHENOBARB UR QL CFM: NEGATIVE NG/ML
PROLACTIN SERPL-MCNC: NEGATIVE NG/ML
RESULT ALL_PRESCRIBED MEDS AND SPECIAL INSTRUCTIONS: NORMAL
SL AMB 5F-ADB-M7 METABOLITE QUANTIFICATION: NEGATIVE NG/ML
SL AMB 7-OH-MITRAGYNINE (KRATOM ALKALOID) QUANTIFICATION: NEGATIVE NG/ML
SL AMB AB-FUBINACA-M3 METABOLITE QUANTIFICATION: NEGATIVE NG/ML
SL AMB ACETYL FENTANYL QUANTIFICATION: NORMAL NG/ML
SL AMB ACETYL NORFENTANYL QUANTIFICATION: NORMAL NG/ML
SL AMB ACRYL FENTANYL QUANTIFICATION: NORMAL NG/ML
SL AMB CARFENTANIL QUANTIFICATION: NORMAL NG/ML
SL AMB CITALOPRAM/ESCITALOPRAM QUANTIFICATION: NEGATIVE NG/ML
SL AMB CITALOPRAM/ESCITALOPRAM QUANTIFICATION: NEGATIVE NG/ML
SL AMB CLOZAPINE QUANTIFICATION: NEGATIVE NG/ML
SL AMB CTHC (MARIJUANA METABOLITE) QUANTIFICATION: ABNORMAL NG/ML
SL AMB CZOLPIDEM (ZOLPIDEM METABOLITE) QUANTIFICATION: NEGATIVE NG/ML
SL AMB DEXTROMETHORPHAN QUANTIFICATION: NEGATIVE NG/ML
SL AMB DEXTRORPHAN (DEXTROMETHORPHAN METABOLITE) QUANT: NEGATIVE NG/ML
SL AMB DEXTRORPHAN (DEXTROMETHORPHAN METABOLITE) QUANT: NEGATIVE NG/ML
SL AMB HYDROXYBUPROPION QUANTIFICATION: NEGATIVE NG/ML
SL AMB HYDROXYRISPERIDONE QUANTIFICATION: NEGATIVE NG/ML
SL AMB JWH018 METABOLITE QUANTIFICATION: NEGATIVE NG/ML
SL AMB JWH073 METABOLITE QUANTIFICATION: NEGATIVE NG/ML
SL AMB MDMB-FUBINACA-M1 METABOLITE QUANTIFICATION: NEGATIVE NG/ML
SL AMB N-DESMETHYL-TRAMADOL QUANTIFICATION: NEGATIVE NG/ML
SL AMB N-DESMETHYLCLOZAPINE QUANTIFICATION: NEGATIVE NG/ML
SL AMB NORQUETIAPINE QUANTIFICATION: NEGATIVE NG/ML
SL AMB PHENTERMINE QUANTIFICATION: NEGATIVE NG/ML
SL AMB QUETIAPINE QUANTIFICATION: NEGATIVE NG/ML
SL AMB RCS4 METABOLITE QUANTIFICATION: NEGATIVE NG/ML
SL AMB RISPERIDONE QUANTIFICATION: NEGATIVE NG/ML
SL AMB RITALINIC ACID QUANTIFICATION: NEGATIVE NG/ML
SMOOTH MUSCLE AB TITR SER IF: NEGATIVE NG/ML
SPECIMEN DRAWN SERPL: NEGATIVE NG/ML
SPECIMEN PH ACCEPTABLE UR: NORMAL
TAPENTADOL UR QL CFM: NEGATIVE NG/ML
TEMAZEPAM UR QL CFM: NEGATIVE NG/ML
TEMAZEPAM UR QL CFM: NEGATIVE NG/ML
TRAMADOL UR QL CFM: NEGATIVE NG/ML
URATE/CREAT 24H UR: NEGATIVE NG/ML

## 2025-07-21 ENCOUNTER — HOSPITAL ENCOUNTER (OUTPATIENT)
Dept: INTERVENTIONAL RADIOLOGY/VASCULAR | Facility: HOSPITAL | Age: 70
Discharge: HOME | End: 2025-07-21
Attending: INTERNAL MEDICINE
Payer: COMMERCIAL

## 2025-07-21 VITALS
OXYGEN SATURATION: 96 % | DIASTOLIC BLOOD PRESSURE: 71 MMHG | BODY MASS INDEX: 23.7 KG/M2 | TEMPERATURE: 98.7 F | HEART RATE: 89 BPM | RESPIRATION RATE: 22 BRPM | WEIGHT: 160 LBS | HEIGHT: 69 IN | SYSTOLIC BLOOD PRESSURE: 136 MMHG

## 2025-07-21 DIAGNOSIS — C25.2 MALIGNANT NEOPLASM OF TAIL OF PANCREAS (HCC): ICD-10-CM

## 2025-07-21 PROCEDURE — 36561 INSERT TUNNELED CV CATH: CPT | Performed by: RADIOLOGY

## 2025-07-21 PROCEDURE — 76937 US GUIDE VASCULAR ACCESS: CPT

## 2025-07-21 PROCEDURE — 77001 FLUOROGUIDE FOR VEIN DEVICE: CPT | Performed by: RADIOLOGY

## 2025-07-21 PROCEDURE — 36561 INSERT TUNNELED CV CATH: CPT

## 2025-07-21 PROCEDURE — 99152 MOD SED SAME PHYS/QHP 5/>YRS: CPT

## 2025-07-21 PROCEDURE — C1788 PORT, INDWELLING, IMP: HCPCS

## 2025-07-21 PROCEDURE — 76937 US GUIDE VASCULAR ACCESS: CPT | Performed by: RADIOLOGY

## 2025-07-21 PROCEDURE — 77001 FLUOROGUIDE FOR VEIN DEVICE: CPT

## 2025-07-21 PROCEDURE — 99152 MOD SED SAME PHYS/QHP 5/>YRS: CPT | Performed by: RADIOLOGY

## 2025-07-21 PROCEDURE — 99153 MOD SED SAME PHYS/QHP EA: CPT

## 2025-07-21 RX ORDER — OXYCODONE HYDROCHLORIDE 5 MG/1
5 TABLET ORAL EVERY 4 HOURS PRN
Refills: 0 | Status: DISCONTINUED | OUTPATIENT
Start: 2025-07-21 | End: 2025-07-25 | Stop reason: HOSPADM

## 2025-07-21 RX ORDER — CEFAZOLIN SODIUM 2 G/50ML
2000 SOLUTION INTRAVENOUS ONCE
Status: COMPLETED | OUTPATIENT
Start: 2025-07-21 | End: 2025-07-21

## 2025-07-21 RX ORDER — MIDAZOLAM HYDROCHLORIDE 2 MG/2ML
INJECTION, SOLUTION INTRAMUSCULAR; INTRAVENOUS AS NEEDED
Status: COMPLETED | OUTPATIENT
Start: 2025-07-21 | End: 2025-07-21

## 2025-07-21 RX ORDER — ACETAMINOPHEN 325 MG/1
650 TABLET ORAL EVERY 4 HOURS PRN
Status: DISCONTINUED | OUTPATIENT
Start: 2025-07-21 | End: 2025-07-25 | Stop reason: HOSPADM

## 2025-07-21 RX ORDER — LIDOCAINE HYDROCHLORIDE AND EPINEPHRINE 10; 10 MG/ML; UG/ML
INJECTION, SOLUTION INFILTRATION; PERINEURAL AS NEEDED
Status: COMPLETED | OUTPATIENT
Start: 2025-07-21 | End: 2025-07-21

## 2025-07-21 RX ORDER — FENTANYL CITRATE 50 UG/ML
INJECTION, SOLUTION INTRAMUSCULAR; INTRAVENOUS AS NEEDED
Status: COMPLETED | OUTPATIENT
Start: 2025-07-21 | End: 2025-07-21

## 2025-07-21 RX ADMIN — FENTANYL CITRATE 50 MCG: 50 INJECTION, SOLUTION INTRAMUSCULAR; INTRAVENOUS at 10:44

## 2025-07-21 RX ADMIN — MIDAZOLAM HYDROCHLORIDE 1 MG: 1 INJECTION, SOLUTION INTRAMUSCULAR; INTRAVENOUS at 10:43

## 2025-07-21 RX ADMIN — FENTANYL CITRATE 50 MCG: 50 INJECTION, SOLUTION INTRAMUSCULAR; INTRAVENOUS at 10:29

## 2025-07-21 RX ADMIN — MIDAZOLAM HYDROCHLORIDE 1 MG: 1 INJECTION, SOLUTION INTRAMUSCULAR; INTRAVENOUS at 10:29

## 2025-07-21 RX ADMIN — CEFAZOLIN SODIUM 2000 MG: 2 SOLUTION INTRAVENOUS at 10:27

## 2025-07-21 RX ADMIN — LIDOCAINE HYDROCHLORIDE,EPINEPHRINE BITARTRATE 10 ML: 10; .01 INJECTION, SOLUTION INFILTRATION; PERINEURAL at 10:50

## 2025-07-21 NOTE — DISCHARGE INSTRUCTIONS
[facility] Interventional Radiology        [address] [place] [address]             Phone:  [phone]                  IR Scheduling: [phone]       Implanted Venous Access Port     WHAT YOU NEED TO KNOW:   An implanted venous access port is a device used to give treatments and take blood. It may also be called a central venous access device (CVAD). The port is a small container that is placed under your skin, usually in your upper chest. The port is attached to a catheter that enters a large vein.     DISCHARGE INSTRUCTIONS:     Resume your normal diet. Small sips of flat soda will help with mild nausea.    Prevent an infection:     Wash your hands often.  Use soap and water. Clean your hands before and after you care for your port. Remind everyone who cares for your port to wash their hands.   Check your skin for infection every day.  Look for redness, swelling, or fluid oozing from the port site.    Care for your port:     1. You may shower beginning 48 hours after procedure.     2.  Leave glue in place.    3. It is normal for some bruising to occur.    4. Use Tylenol for pain.    5. Limit use of arm on the side that your port was placed. Lift nothing heavier than 5 pounds for 1 week, and then gradually increase activity as tolerated.    6. DO NOT apply ointment, lotion or cream to port site until incision is healed. Allow glue to fall off. DO NOT attempt to peel glue from skin even it it begins to flake.     7. After the port incision is healed you may swim, bathe.    Notify the Interventional Radiologist if you have any of the followin. Fever above 101 F    2. Increased redness or swelling after 1st day.     3. Increased pain after 1st day.    4. Any sign of infection (drainage from port site, skin separation, hot to touch).    5. Persistent nausea or vomiting.    Contact Interventional Radiology at [phone]     Procedural Sedation   WHAT YOU NEED TO KNOW:    Procedural sedation is medicine used during procedures to help you feel relaxed and calm. You will remember little to none of the procedure. After sedation you may feel tired, weak, or unsteady on your feet. You may also have trouble concentrating or short-term memory loss. These symptoms should go away in 24 hours or less.     DISCHARGE INSTRUCTIONS:     Call 911 or have someone else call for any of the following:   You have sudden trouble breathing.     You cannot be woken.     Contact Interventional Radiology at [phone] if any of the following occur:      You have a severe headache or dizziness.     Your heart is beating faster than usual.    You have a fever or chills.     Your skin is itchy, swollen, or you have a rash.     You have nausea or are vomiting for more than 8 hours after the procedure.      You have questions or concerns about your condition or care.    Self-care:     Have someone stay with you for 24 hours. This person can drive you to errands and help you do things around the house. This person can also watch for problems.      Rest and do quiet activities for 24 hours. Do not exercise, ride a bike, or play sports. Stand up slowly to prevent dizziness and falls. Take short walks around the house with another person. Slowly return to your usual activities the next day.      Do not drive or use dangerous machines or tools for 24 hours. You may injure yourself or others. Examples include a lawnmower, saw, or drill. Do not return to work for 24 hours if you use dangerous machines or tools for work.      Do not make important decisions for 24 hours. For example, do not sign important papers or invest money.      Drink liquids as directed. Liquids help flush the sedation medicine out of your body. Ask how much liquid to drink each day and which liquids are best for you.      Eat small, frequent meals to prevent nausea and vomiting. Start with clear liquids such as juice or broth. If you do not  vomit after clear liquids, you can eat your usual foods.      Do not drink alcohol or take medicines that make you drowsy. This includes medicines that help you sleep and anxiety medicines. Ask your healthcare provider if it is safe for you to take pain medicine.    Follow up with your healthcare provider as directed: Write down your questions so you remember to ask them during your visits.

## 2025-07-21 NOTE — H&P
"Interventional Radiology  History and Physical 7/21/2025     Timo Sandoval   1955   [id number]    H&P reviewed. There have been no interval changes since the time the H&P was written.    /70   Pulse 86   Temp 98.3 °F (36.8 °C) (Temporal)   Resp 22   Ht 5' 9\" (1.753 m)   Wt 72.6 kg (160 lb)   SpO2 96%   BMI 23.63 kg/m²     Prior imaging was reviewed.  Presents today for port placement.  Metastatic pancreatic cancer.  Procedure discussed and all questions answered.    Informed written consent was obtained.    Mike Melchor MD   "

## 2025-07-21 NOTE — BRIEF OP NOTE (RAD/CATH)
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    Surgeon: Mike Melchor MD     Assistant: None. No qualified resident was available.    Blood loss: less than 5ml     Specimens: none     Findings: Right chest port placed with image guidance     Complications: None immediate.    Anesthesia: conscious sedation

## 2025-07-21 NOTE — PROGRESS NOTES
Left message for patient regarding new patient appt tomorrow, 7-22 at 10:00.  Advised patient of the following:  no fragrences, cell phones permitted with free WIFI, TV in bay, light snacks and drinks available, may bring own food and can refrigerate or heat, able to bring own meds that would be due while in visit, one visitor allowed and no children under age of 14, if there needs to be a cancel, please cancel 24 hours in advance

## 2025-07-22 ENCOUNTER — HOSPITAL ENCOUNTER (OUTPATIENT)
Dept: INFUSION CENTER | Facility: CLINIC | Age: 70
Discharge: HOME | End: 2025-07-22
Attending: INTERNAL MEDICINE
Payer: COMMERCIAL

## 2025-07-22 VITALS
WEIGHT: 159.39 LBS | BODY MASS INDEX: 24.16 KG/M2 | TEMPERATURE: 97 F | DIASTOLIC BLOOD PRESSURE: 72 MMHG | HEART RATE: 82 BPM | HEIGHT: 68 IN | SYSTOLIC BLOOD PRESSURE: 128 MMHG | RESPIRATION RATE: 18 BRPM

## 2025-07-22 DIAGNOSIS — D70.1 CHEMOTHERAPY INDUCED NEUTROPENIA (HCC): Primary | ICD-10-CM

## 2025-07-22 DIAGNOSIS — C78.7 PANCREATIC CANCER METASTASIZED TO LIVER (HCC): ICD-10-CM

## 2025-07-22 DIAGNOSIS — T45.1X5A CHEMOTHERAPY INDUCED NEUTROPENIA (HCC): Primary | ICD-10-CM

## 2025-07-22 DIAGNOSIS — C25.9 PANCREATIC CANCER METASTASIZED TO LIVER (HCC): ICD-10-CM

## 2025-07-22 PROCEDURE — 96375 TX/PRO/DX INJ NEW DRUG ADDON: CPT

## 2025-07-22 PROCEDURE — G0498 CHEMO EXTEND IV INFUS W/PUMP: HCPCS

## 2025-07-22 PROCEDURE — 96367 TX/PROPH/DG ADDL SEQ IV INF: CPT

## 2025-07-22 PROCEDURE — 96413 CHEMO IV INFUSION 1 HR: CPT

## 2025-07-22 PROCEDURE — 96417 CHEMO IV INFUS EACH ADDL SEQ: CPT

## 2025-07-22 PROCEDURE — 96415 CHEMO IV INFUSION ADDL HR: CPT

## 2025-07-22 RX ORDER — ATROPINE SULFATE 1 MG/ML
0.25 INJECTION, SOLUTION INTRAVENOUS ONCE AS NEEDED
Status: DISCONTINUED | OUTPATIENT
Start: 2025-07-22 | End: 2025-07-25 | Stop reason: HOSPADM

## 2025-07-22 RX ORDER — SODIUM CHLORIDE 9 MG/ML
20 INJECTION, SOLUTION INTRAVENOUS ONCE AS NEEDED
Status: DISCONTINUED | OUTPATIENT
Start: 2025-07-22 | End: 2025-07-25 | Stop reason: HOSPADM

## 2025-07-22 RX ORDER — ATROPINE SULFATE 1 MG/ML
0.25 INJECTION, SOLUTION INTRAVENOUS ONCE
Status: COMPLETED | OUTPATIENT
Start: 2025-07-22 | End: 2025-07-22

## 2025-07-22 RX ORDER — DEXTROSE MONOHYDRATE 50 MG/ML
20 INJECTION, SOLUTION INTRAVENOUS ONCE
Status: COMPLETED | OUTPATIENT
Start: 2025-07-22 | End: 2025-07-22

## 2025-07-22 RX ADMIN — LEUCOVORIN CALCIUM 750 MG: 500 INJECTION, POWDER, LYOPHILIZED, FOR SOLUTION INTRAMUSCULAR; INTRAVENOUS at 15:34

## 2025-07-22 RX ADMIN — DEXTROSE 20 ML/HR: 5 SOLUTION INTRAVENOUS at 11:49

## 2025-07-22 RX ADMIN — DEXAMETHASONE SODIUM PHOSPHATE: 10 INJECTION, SOLUTION INTRAMUSCULAR; INTRAVENOUS at 10:44

## 2025-07-22 RX ADMIN — ATROPINE SULFATE 0.25 MG: 1 INJECTION, SOLUTION INTRAVENOUS at 11:46

## 2025-07-22 RX ADMIN — FOSAPREPITANT 150 MG: 150 INJECTION, POWDER, LYOPHILIZED, FOR SOLUTION INTRAVENOUS at 11:09

## 2025-07-22 RX ADMIN — OXALIPLATIN 113.4 MG: 5 INJECTION, SOLUTION INTRAVENOUS at 13:28

## 2025-07-22 RX ADMIN — SODIUM CHLORIDE 20 ML/HR: 9 INJECTION, SOLUTION INTRAVENOUS at 10:44

## 2025-07-22 RX ADMIN — IRINOTECAN HYDROCHLORIDE 94.6 MG: 4.3 INJECTION, POWDER, FOR SOLUTION INTRAVENOUS at 11:50

## 2025-07-22 NOTE — PROGRESS NOTES
Patient arrived to unit without complaint. Patient tolerated chemotherapy without incident. 5FU JL connected and patient aware of next appointment on 7/24/25 at 14:30 for disconnect in 46 hours. AVS declined and patient left in stable condition.

## 2025-07-24 ENCOUNTER — DOCUMENTATION (OUTPATIENT)
Dept: HEMATOLOGY ONCOLOGY | Facility: CLINIC | Age: 70
End: 2025-07-24

## 2025-07-24 ENCOUNTER — HOSPITAL ENCOUNTER (OUTPATIENT)
Dept: INFUSION CENTER | Facility: CLINIC | Age: 70
Discharge: HOME | End: 2025-07-24
Attending: INTERNAL MEDICINE
Payer: COMMERCIAL

## 2025-07-24 VITALS
SYSTOLIC BLOOD PRESSURE: 133 MMHG | HEART RATE: 74 BPM | RESPIRATION RATE: 18 BRPM | DIASTOLIC BLOOD PRESSURE: 77 MMHG | TEMPERATURE: 98.5 F

## 2025-07-24 DIAGNOSIS — C78.7 PANCREATIC CANCER METASTASIZED TO LIVER (HCC): ICD-10-CM

## 2025-07-24 DIAGNOSIS — T45.1X5A CHEMOTHERAPY INDUCED NEUTROPENIA (HCC): Primary | ICD-10-CM

## 2025-07-24 DIAGNOSIS — C25.9 PANCREATIC CANCER METASTASIZED TO LIVER (HCC): ICD-10-CM

## 2025-07-24 DIAGNOSIS — D70.1 CHEMOTHERAPY INDUCED NEUTROPENIA (HCC): Primary | ICD-10-CM

## 2025-07-24 PROCEDURE — 96372 THER/PROPH/DIAG INJ SC/IM: CPT

## 2025-07-24 RX ADMIN — PEGFILGRASTIM 6 MG: KIT SUBCUTANEOUS at 14:37

## 2025-07-24 NOTE — PROGRESS NOTES
Jun met with PT and PT son after his infusion treatment on 7/24/25. PT provided writer with all signatures and documents to submit FA application. Junr informed PT that he would submit application to [facility] team and did so via email on 7/24/25.  provided all contact information to the PT for future use.          Shilo Barbosa  Phone:[phone]  Email:[email or web address]

## 2025-07-26 ENCOUNTER — TELEPHONE (OUTPATIENT)
Dept: OTHER | Facility: OTHER | Age: 70
End: 2025-07-26

## 2025-07-26 ENCOUNTER — TELEPHONE (OUTPATIENT)
Dept: HEMATOLOGY ONCOLOGY | Facility: CLINIC | Age: 70
End: 2025-07-26

## 2025-07-26 NOTE — TELEPHONE ENCOUNTER
His daughter is calling in to report patient is very fatigued and nauseous. Still not feeling himself. Would like to know if this is to be expected days after treatment. I explained the side effects of NALIRIFOX and this seems related. No fever or diarrhea. Provided warning signs.

## 2025-07-26 NOTE — TELEPHONE ENCOUNTER
Patients daughter calling in to report that he had chemo on Tuesday. He is still very fatigued and had nausea. Not feeling himself. She would like to know if this is to be expected days after treatment. Paged on call provider.

## 2025-07-28 DIAGNOSIS — C25.9 PANCREATIC CANCER METASTASIZED TO LIVER (HCC): Primary | ICD-10-CM

## 2025-07-28 DIAGNOSIS — D70.1 CHEMOTHERAPY INDUCED NEUTROPENIA (HCC): ICD-10-CM

## 2025-07-28 DIAGNOSIS — C78.7 PANCREATIC CANCER METASTASIZED TO LIVER (HCC): Primary | ICD-10-CM

## 2025-07-28 DIAGNOSIS — T45.1X5A CHEMOTHERAPY INDUCED NEUTROPENIA (HCC): ICD-10-CM

## 2025-07-28 PROBLEM — Z95.828 PORT-A-CATH IN PLACE: Status: ACTIVE | Noted: 2025-07-28

## 2025-07-29 ENCOUNTER — TELEPHONE (OUTPATIENT)
Dept: NUTRITION | Facility: CLINIC | Age: 70
End: 2025-07-29

## 2025-07-29 ENCOUNTER — TELEPHONE (OUTPATIENT)
Age: 70
End: 2025-07-29

## 2025-07-29 ENCOUNTER — TELEPHONE (OUTPATIENT)
Dept: HEMATOLOGY ONCOLOGY | Facility: CLINIC | Age: 70
End: 2025-07-29

## 2025-07-29 ENCOUNTER — DOCUMENTATION (OUTPATIENT)
Dept: PALLIATIVE MEDICINE | Facility: CLINIC | Age: 70
End: 2025-07-29

## 2025-07-29 DIAGNOSIS — D70.1 CHEMOTHERAPY INDUCED NEUTROPENIA (HCC): ICD-10-CM

## 2025-07-29 DIAGNOSIS — C78.7 PANCREATIC CANCER METASTASIZED TO LIVER (HCC): Primary | ICD-10-CM

## 2025-07-29 DIAGNOSIS — C25.9 PANCREATIC CANCER METASTASIZED TO LIVER (HCC): Primary | ICD-10-CM

## 2025-07-29 DIAGNOSIS — T45.1X5A CHEMOTHERAPY INDUCED NEUTROPENIA (HCC): ICD-10-CM

## 2025-07-30 ENCOUNTER — TELEPHONE (OUTPATIENT)
Age: 70
End: 2025-07-30

## 2025-07-30 ENCOUNTER — PATIENT OUTREACH (OUTPATIENT)
Dept: CASE MANAGEMENT | Facility: OTHER | Age: 70
End: 2025-07-30

## 2025-07-31 ENCOUNTER — PATIENT OUTREACH (OUTPATIENT)
Dept: CASE MANAGEMENT | Facility: OTHER | Age: 70
End: 2025-07-31

## 2025-08-04 ENCOUNTER — PATIENT OUTREACH (OUTPATIENT)
Dept: CASE MANAGEMENT | Facility: OTHER | Age: 70
End: 2025-08-04

## 2025-08-04 DIAGNOSIS — C25.9 PANCREATIC CANCER METASTASIZED TO LIVER (HCC): Primary | ICD-10-CM

## 2025-08-04 DIAGNOSIS — D70.1 CHEMOTHERAPY INDUCED NEUTROPENIA (HCC): ICD-10-CM

## 2025-08-04 DIAGNOSIS — T45.1X5A CHEMOTHERAPY INDUCED NEUTROPENIA (HCC): ICD-10-CM

## 2025-08-04 DIAGNOSIS — C78.7 PANCREATIC CANCER METASTASIZED TO LIVER (HCC): Primary | ICD-10-CM

## 2025-08-07 ENCOUNTER — TELEPHONE (OUTPATIENT)
Dept: HEMATOLOGY ONCOLOGY | Facility: CLINIC | Age: 70
End: 2025-08-07

## 2025-08-07 ENCOUNTER — OFFICE VISIT (OUTPATIENT)
Dept: HEMATOLOGY ONCOLOGY | Facility: CLINIC | Age: 70
End: 2025-08-07
Payer: COMMERCIAL

## 2025-08-07 ENCOUNTER — HOSPITAL ENCOUNTER (OUTPATIENT)
Dept: ULTRASOUND IMAGING | Facility: HOSPITAL | Age: 70
Discharge: HOME | End: 2025-08-07
Attending: INTERNAL MEDICINE
Payer: COMMERCIAL

## 2025-08-07 VITALS
WEIGHT: 155 LBS | BODY MASS INDEX: 23.49 KG/M2 | OXYGEN SATURATION: 97 % | HEART RATE: 76 BPM | TEMPERATURE: 97.3 F | RESPIRATION RATE: 16 BRPM | DIASTOLIC BLOOD PRESSURE: 60 MMHG | SYSTOLIC BLOOD PRESSURE: 124 MMHG | HEIGHT: 68 IN

## 2025-08-07 DIAGNOSIS — C25.9 PANCREATIC CANCER METASTASIZED TO LIVER (HCC): Primary | ICD-10-CM

## 2025-08-07 DIAGNOSIS — C78.7 PANCREATIC CANCER METASTASIZED TO LIVER (HCC): ICD-10-CM

## 2025-08-07 DIAGNOSIS — R63.0 POOR APPETITE: Primary | ICD-10-CM

## 2025-08-07 DIAGNOSIS — C78.7 PANCREATIC CANCER METASTASIZED TO LIVER (HCC): Primary | ICD-10-CM

## 2025-08-07 DIAGNOSIS — C25.9 PANCREATIC CANCER METASTASIZED TO LIVER (HCC): ICD-10-CM

## 2025-08-07 DIAGNOSIS — R53.1 WEAKNESS: ICD-10-CM

## 2025-08-07 PROCEDURE — G2211 COMPLEX E/M VISIT ADD ON: HCPCS | Performed by: INTERNAL MEDICINE

## 2025-08-07 PROCEDURE — 99215 OFFICE O/P EST HI 40 MIN: CPT | Performed by: INTERNAL MEDICINE

## 2025-08-07 PROCEDURE — 76705 ECHO EXAM OF ABDOMEN: CPT

## 2025-08-08 ENCOUNTER — TELEPHONE (OUTPATIENT)
Dept: PALLIATIVE MEDICINE | Facility: CLINIC | Age: 70
End: 2025-08-08

## 2025-08-08 ENCOUNTER — TELEPHONE (OUTPATIENT)
Age: 70
End: 2025-08-08

## 2025-08-12 ENCOUNTER — TELEPHONE (OUTPATIENT)
Dept: HEMATOLOGY ONCOLOGY | Facility: CLINIC | Age: 70
End: 2025-08-12

## 2025-08-12 ENCOUNTER — HOSPITAL ENCOUNTER (OUTPATIENT)
Dept: INFUSION CENTER | Facility: CLINIC | Age: 70
Discharge: HOME | End: 2025-08-12
Payer: COMMERCIAL

## 2025-08-13 ENCOUNTER — HOSPITAL ENCOUNTER (OUTPATIENT)
Dept: INFUSION CENTER | Facility: HOSPITAL | Age: 70
Discharge: HOME | End: 2025-08-13
Attending: INTERNAL MEDICINE

## 2025-08-13 ENCOUNTER — PREP FOR PROCEDURE (OUTPATIENT)
Dept: INTERVENTIONAL RADIOLOGY/VASCULAR | Facility: CLINIC | Age: 70
End: 2025-08-13

## 2025-08-13 ENCOUNTER — HOSPITAL ENCOUNTER (OUTPATIENT)
Dept: RADIOLOGY | Facility: HOSPITAL | Age: 70
Discharge: HOME | End: 2025-08-13
Attending: INTERNAL MEDICINE

## 2025-08-13 ENCOUNTER — TELEPHONE (OUTPATIENT)
Dept: HEMATOLOGY ONCOLOGY | Facility: CLINIC | Age: 70
End: 2025-08-13

## 2025-08-14 ENCOUNTER — TELEPHONE (OUTPATIENT)
Dept: INTERVENTIONAL RADIOLOGY/VASCULAR | Facility: HOSPITAL | Age: 70
End: 2025-08-14

## 2025-08-15 ENCOUNTER — HOSPITAL ENCOUNTER (OUTPATIENT)
Dept: INTERVENTIONAL RADIOLOGY/VASCULAR | Facility: HOSPITAL | Age: 70
End: 2025-08-15
Attending: PHYSICIAN ASSISTANT
Payer: COMMERCIAL

## 2025-08-16 ENCOUNTER — TELEPHONE (OUTPATIENT)
Dept: OTHER | Facility: OTHER | Age: 70
End: 2025-08-16

## 2025-08-18 ENCOUNTER — TELEPHONE (OUTPATIENT)
Age: 70
End: 2025-08-18

## 2025-08-18 ENCOUNTER — TELEPHONE (OUTPATIENT)
Dept: HEMATOLOGY ONCOLOGY | Facility: CLINIC | Age: 70
End: 2025-08-18

## 2025-08-18 ENCOUNTER — HOSPITAL ENCOUNTER (OUTPATIENT)
Dept: INTERVENTIONAL RADIOLOGY/VASCULAR | Facility: HOSPITAL | Age: 70
Discharge: HOME | End: 2025-08-18
Attending: STUDENT IN AN ORGANIZED HEALTH CARE EDUCATION/TRAINING PROGRAM
Payer: COMMERCIAL

## 2025-08-18 DIAGNOSIS — T80.212S PORT OR RESERVOIR INFECTION, SEQUELA: ICD-10-CM

## 2025-08-18 PROCEDURE — 49424 ASSESS CYST CONTRAST INJECT: CPT

## 2025-08-18 PROCEDURE — 76080 X-RAY EXAM OF FISTULA: CPT

## 2025-08-18 RX ADMIN — IOHEXOL 1 ML: 350 INJECTION, SOLUTION INTRAVENOUS at 13:39

## 2025-08-21 ENCOUNTER — TELEMEDICINE (OUTPATIENT)
Dept: HEMATOLOGY ONCOLOGY | Facility: CLINIC | Age: 70
End: 2025-08-21
Payer: COMMERCIAL

## 2025-08-21 ENCOUNTER — DOCUMENTATION (OUTPATIENT)
Dept: HEMATOLOGY ONCOLOGY | Facility: CLINIC | Age: 70
End: 2025-08-21

## 2025-08-21 DIAGNOSIS — C78.7 PANCREATIC CANCER METASTASIZED TO LIVER (HCC): Primary | ICD-10-CM

## 2025-08-21 DIAGNOSIS — C25.9 PANCREATIC CANCER METASTASIZED TO LIVER (HCC): Primary | ICD-10-CM

## 2025-08-21 PROCEDURE — 99215 OFFICE O/P EST HI 40 MIN: CPT | Performed by: INTERNAL MEDICINE

## 2025-08-21 PROCEDURE — G2211 COMPLEX E/M VISIT ADD ON: HCPCS | Performed by: INTERNAL MEDICINE

## 2025-08-22 ENCOUNTER — OFFICE VISIT (OUTPATIENT)
Dept: PALLIATIVE MEDICINE | Facility: CLINIC | Age: 70
End: 2025-08-22
Payer: COMMERCIAL

## 2025-08-22 ENCOUNTER — DOCUMENTATION (OUTPATIENT)
Dept: PALLIATIVE MEDICINE | Facility: CLINIC | Age: 70
End: 2025-08-22

## 2025-08-22 VITALS
BODY MASS INDEX: 23.34 KG/M2 | WEIGHT: 154 LBS | HEIGHT: 68 IN | OXYGEN SATURATION: 98 % | HEART RATE: 82 BPM | TEMPERATURE: 98.2 F | DIASTOLIC BLOOD PRESSURE: 70 MMHG | RESPIRATION RATE: 18 BRPM | SYSTOLIC BLOOD PRESSURE: 124 MMHG

## 2025-08-22 DIAGNOSIS — K59.03 THERAPEUTIC OPIOID-INDUCED CONSTIPATION (OIC): ICD-10-CM

## 2025-08-22 DIAGNOSIS — Z71.89 COUNSELING REGARDING ADVANCE CARE PLANNING AND GOALS OF CARE: ICD-10-CM

## 2025-08-22 DIAGNOSIS — Z51.5 PALLIATIVE CARE BY SPECIALIST: ICD-10-CM

## 2025-08-22 DIAGNOSIS — T40.2X5A THERAPEUTIC OPIOID-INDUCED CONSTIPATION (OIC): ICD-10-CM

## 2025-08-22 DIAGNOSIS — G89.3 CANCER RELATED PAIN: ICD-10-CM

## 2025-08-22 DIAGNOSIS — C78.7 PANCREATIC CANCER METASTASIZED TO LIVER (HCC): Primary | ICD-10-CM

## 2025-08-22 DIAGNOSIS — C25.9 PANCREATIC CANCER METASTASIZED TO LIVER (HCC): Primary | ICD-10-CM

## 2025-08-22 PROCEDURE — 99497 ADVNCD CARE PLAN 30 MIN: CPT | Performed by: STUDENT IN AN ORGANIZED HEALTH CARE EDUCATION/TRAINING PROGRAM

## 2025-08-22 PROCEDURE — 99215 OFFICE O/P EST HI 40 MIN: CPT | Performed by: STUDENT IN AN ORGANIZED HEALTH CARE EDUCATION/TRAINING PROGRAM

## 2025-08-22 RX ORDER — DEXAMETHASONE 2 MG/1
2 TABLET ORAL
Qty: 30 TABLET | Refills: 2 | Status: SHIPPED | OUTPATIENT
Start: 2025-08-22

## 2025-08-22 RX ORDER — OXYCODONE AND ACETAMINOPHEN 5; 325 MG/1; MG/1
1 TABLET ORAL EVERY 4 HOURS PRN
Qty: 90 TABLET | Refills: 0 | Status: SHIPPED | OUTPATIENT
Start: 2025-08-26

## 2025-08-23 ENCOUNTER — OFFICE VISIT (OUTPATIENT)
Dept: URGENT CARE | Facility: CLINIC | Age: 70
End: 2025-08-23
Payer: COMMERCIAL

## 2025-08-23 VITALS
HEART RATE: 82 BPM | OXYGEN SATURATION: 98 % | SYSTOLIC BLOOD PRESSURE: 126 MMHG | TEMPERATURE: 96.1 F | BODY MASS INDEX: 22.54 KG/M2 | RESPIRATION RATE: 18 BRPM | WEIGHT: 152.2 LBS | HEIGHT: 69 IN | DIASTOLIC BLOOD PRESSURE: 66 MMHG

## 2025-08-23 DIAGNOSIS — D36.7 CYST, DERMOID, ARM, LEFT: Primary | ICD-10-CM

## 2025-08-23 PROCEDURE — 99213 OFFICE O/P EST LOW 20 MIN: CPT

## 2025-08-23 RX ORDER — CEPHALEXIN 500 MG/1
500 CAPSULE ORAL EVERY 6 HOURS SCHEDULED
Qty: 28 CAPSULE | Refills: 0 | Status: SHIPPED | OUTPATIENT
Start: 2025-08-23 | End: 2025-08-30

## 2025-08-25 PROBLEM — M62.9 NODULE IN MUSCLE: Status: ACTIVE | Noted: 2025-08-25
